# Patient Record
Sex: MALE | Race: WHITE | NOT HISPANIC OR LATINO | Employment: OTHER | URBAN - METROPOLITAN AREA
[De-identification: names, ages, dates, MRNs, and addresses within clinical notes are randomized per-mention and may not be internally consistent; named-entity substitution may affect disease eponyms.]

---

## 2022-09-04 ENCOUNTER — APPOINTMENT (OUTPATIENT)
Dept: CT IMAGING | Facility: CLINIC | Age: 72
DRG: 637 | End: 2022-09-04
Attending: EMERGENCY MEDICINE
Payer: MEDICARE

## 2022-09-04 ENCOUNTER — HOSPITAL ENCOUNTER (INPATIENT)
Facility: CLINIC | Age: 72
LOS: 12 days | Discharge: HOME | DRG: 637 | End: 2022-09-16
Attending: EMERGENCY MEDICINE | Admitting: INTERNAL MEDICINE
Payer: MEDICARE

## 2022-09-04 DIAGNOSIS — R41.0 CONFUSION: ICD-10-CM

## 2022-09-04 DIAGNOSIS — E10.65 TYPE 1 DIABETES MELLITUS WITH HYPERGLYCEMIA (H): ICD-10-CM

## 2022-09-04 DIAGNOSIS — F31.71 BIPOLAR DISORDER, IN PARTIAL REMISSION, MOST RECENT EPISODE HYPOMANIC (H): ICD-10-CM

## 2022-09-04 DIAGNOSIS — E11.00 HYPEROSMOLAR HYPERGLYCEMIC STATE (HHS) (H): Primary | ICD-10-CM

## 2022-09-04 LAB
ALBUMIN SERPL-MCNC: 4 G/DL (ref 3.4–5)
ALBUMIN UR-MCNC: NEGATIVE MG/DL
ALP SERPL-CCNC: 102 U/L (ref 40–150)
ALT SERPL W P-5'-P-CCNC: 16 U/L (ref 0–70)
ANION GAP SERPL CALCULATED.3IONS-SCNC: 11 MMOL/L (ref 3–14)
ANION GAP SERPL CALCULATED.3IONS-SCNC: 7 MMOL/L (ref 3–14)
APPEARANCE UR: CLEAR
AST SERPL W P-5'-P-CCNC: 15 U/L (ref 0–45)
ATRIAL RATE - MUSE: 96 BPM
BACTERIA #/AREA URNS HPF: ABNORMAL /HPF
BASOPHILS # BLD AUTO: 0 10E3/UL (ref 0–0.2)
BASOPHILS NFR BLD AUTO: 0 %
BILIRUB SERPL-MCNC: 0.7 MG/DL (ref 0.2–1.3)
BILIRUB UR QL STRIP: NEGATIVE
BUN SERPL-MCNC: 13 MG/DL (ref 7–30)
BUN SERPL-MCNC: 15 MG/DL (ref 7–30)
CALCIUM SERPL-MCNC: 8.6 MG/DL (ref 8.5–10.1)
CALCIUM SERPL-MCNC: 9.7 MG/DL (ref 8.5–10.1)
CHLORIDE BLD-SCNC: 100 MMOL/L (ref 94–109)
CHLORIDE BLD-SCNC: 109 MMOL/L (ref 94–109)
CO2 SERPL-SCNC: 22 MMOL/L (ref 20–32)
CO2 SERPL-SCNC: 23 MMOL/L (ref 20–32)
COLOR UR AUTO: ABNORMAL
CREAT SERPL-MCNC: 0.64 MG/DL (ref 0.66–1.25)
CREAT SERPL-MCNC: 0.7 MG/DL (ref 0.66–1.25)
DIASTOLIC BLOOD PRESSURE - MUSE: NORMAL MMHG
EOSINOPHIL # BLD AUTO: 0 10E3/UL (ref 0–0.7)
EOSINOPHIL NFR BLD AUTO: 1 %
ERYTHROCYTE [DISTWIDTH] IN BLOOD BY AUTOMATED COUNT: 12.5 % (ref 10–15)
GFR SERPL CREATININE-BSD FRML MDRD: >90 ML/MIN/1.73M2
GFR SERPL CREATININE-BSD FRML MDRD: >90 ML/MIN/1.73M2
GLUCOSE BLD-MCNC: 286 MG/DL (ref 70–99)
GLUCOSE BLD-MCNC: 606 MG/DL (ref 70–99)
GLUCOSE BLDC GLUCOMTR-MCNC: 248 MG/DL (ref 70–99)
GLUCOSE BLDC GLUCOMTR-MCNC: 322 MG/DL (ref 70–99)
GLUCOSE BLDC GLUCOMTR-MCNC: 367 MG/DL (ref 70–99)
GLUCOSE BLDC GLUCOMTR-MCNC: 550 MG/DL (ref 70–99)
GLUCOSE UR STRIP-MCNC: >=1000 MG/DL
HBA1C MFR BLD: 8.8 % (ref 0–5.6)
HCO3 BLDV-SCNC: 25 MMOL/L (ref 21–28)
HCT VFR BLD AUTO: 43.2 % (ref 40–53)
HGB BLD-MCNC: 14.6 G/DL (ref 13.3–17.7)
HGB UR QL STRIP: NEGATIVE
IMM GRANULOCYTES # BLD: 0 10E3/UL
IMM GRANULOCYTES NFR BLD: 0 %
INTERPRETATION ECG - MUSE: NORMAL
KETONES BLD-SCNC: 0.2 MMOL/L (ref 0–0.6)
KETONES BLD-SCNC: 1.9 MMOL/L (ref 0–0.6)
KETONES UR STRIP-MCNC: 40 MG/DL
LACTATE BLD-SCNC: 1.4 MMOL/L
LEUKOCYTE ESTERASE UR QL STRIP: NEGATIVE
LYMPHOCYTES # BLD AUTO: 1.4 10E3/UL (ref 0.8–5.3)
LYMPHOCYTES NFR BLD AUTO: 18 %
MAGNESIUM SERPL-MCNC: 1.8 MG/DL (ref 1.6–2.3)
MCH RBC QN AUTO: 30.7 PG (ref 26.5–33)
MCHC RBC AUTO-ENTMCNC: 33.8 G/DL (ref 31.5–36.5)
MCV RBC AUTO: 91 FL (ref 78–100)
MONOCYTES # BLD AUTO: 0.6 10E3/UL (ref 0–1.3)
MONOCYTES NFR BLD AUTO: 7 %
MUCOUS THREADS #/AREA URNS LPF: PRESENT /LPF
NEUTROPHILS # BLD AUTO: 5.6 10E3/UL (ref 1.6–8.3)
NEUTROPHILS NFR BLD AUTO: 74 %
NITRATE UR QL: NEGATIVE
NRBC # BLD AUTO: 0 10E3/UL
NRBC BLD AUTO-RTO: 0 /100
OSMOLALITY SERPL: 318 MMOL/KG (ref 280–301)
P AXIS - MUSE: 53 DEGREES
PCO2 BLDV: 40 MM HG (ref 40–50)
PH BLDV: 7.4 [PH] (ref 7.32–7.43)
PH UR STRIP: 5 [PH] (ref 5–7)
PHOSPHATE SERPL-MCNC: 4.2 MG/DL (ref 2.5–4.5)
PLATELET # BLD AUTO: 219 10E3/UL (ref 150–450)
PO2 BLDV: 55 MM HG (ref 25–47)
POTASSIUM BLD-SCNC: 3.7 MMOL/L (ref 3.4–5.3)
POTASSIUM BLD-SCNC: 4.4 MMOL/L (ref 3.4–5.3)
PR INTERVAL - MUSE: 206 MS
PROT SERPL-MCNC: 7.3 G/DL (ref 6.8–8.8)
QRS DURATION - MUSE: 126 MS
QT - MUSE: 392 MS
QTC - MUSE: 495 MS
R AXIS - MUSE: 0 DEGREES
RBC # BLD AUTO: 4.75 10E6/UL (ref 4.4–5.9)
RBC URINE: <1 /HPF
SAO2 % BLDV: 88 % (ref 94–100)
SODIUM SERPL-SCNC: 133 MMOL/L (ref 133–144)
SODIUM SERPL-SCNC: 139 MMOL/L (ref 133–144)
SP GR UR STRIP: 1.02 (ref 1–1.03)
SQUAMOUS EPITHELIAL: <1 /HPF
SYSTOLIC BLOOD PRESSURE - MUSE: NORMAL MMHG
T AXIS - MUSE: 43 DEGREES
TROPONIN I SERPL HS-MCNC: 4 NG/L
UROBILINOGEN UR STRIP-MCNC: NORMAL MG/DL
VENTRICULAR RATE- MUSE: 96 BPM
WBC # BLD AUTO: 7.6 10E3/UL (ref 4–11)
WBC URINE: 1 /HPF

## 2022-09-04 PROCEDURE — 83735 ASSAY OF MAGNESIUM: CPT | Performed by: PHYSICIAN ASSISTANT

## 2022-09-04 PROCEDURE — 81001 URINALYSIS AUTO W/SCOPE: CPT | Performed by: EMERGENCY MEDICINE

## 2022-09-04 PROCEDURE — 82803 BLOOD GASES ANY COMBINATION: CPT

## 2022-09-04 PROCEDURE — 70450 CT HEAD/BRAIN W/O DYE: CPT

## 2022-09-04 PROCEDURE — 96375 TX/PRO/DX INJ NEW DRUG ADDON: CPT

## 2022-09-04 PROCEDURE — 96365 THER/PROPH/DIAG IV INF INIT: CPT

## 2022-09-04 PROCEDURE — 250N000011 HC RX IP 250 OP 636: Performed by: EMERGENCY MEDICINE

## 2022-09-04 PROCEDURE — 83930 ASSAY OF BLOOD OSMOLALITY: CPT | Performed by: EMERGENCY MEDICINE

## 2022-09-04 PROCEDURE — 258N000003 HC RX IP 258 OP 636: Performed by: PHYSICIAN ASSISTANT

## 2022-09-04 PROCEDURE — 120N000013 HC R&B IMCU

## 2022-09-04 PROCEDURE — 84100 ASSAY OF PHOSPHORUS: CPT | Performed by: PHYSICIAN ASSISTANT

## 2022-09-04 PROCEDURE — 82010 KETONE BODYS QUAN: CPT | Performed by: PHYSICIAN ASSISTANT

## 2022-09-04 PROCEDURE — 83036 HEMOGLOBIN GLYCOSYLATED A1C: CPT | Performed by: EMERGENCY MEDICINE

## 2022-09-04 PROCEDURE — 96361 HYDRATE IV INFUSION ADD-ON: CPT

## 2022-09-04 PROCEDURE — 99291 CRITICAL CARE FIRST HOUR: CPT | Mod: 25

## 2022-09-04 PROCEDURE — 93005 ELECTROCARDIOGRAM TRACING: CPT

## 2022-09-04 PROCEDURE — 96376 TX/PRO/DX INJ SAME DRUG ADON: CPT

## 2022-09-04 PROCEDURE — 258N000003 HC RX IP 258 OP 636: Performed by: EMERGENCY MEDICINE

## 2022-09-04 PROCEDURE — 96366 THER/PROPH/DIAG IV INF ADDON: CPT

## 2022-09-04 PROCEDURE — 99223 1ST HOSP IP/OBS HIGH 75: CPT | Mod: AI | Performed by: PHYSICIAN ASSISTANT

## 2022-09-04 PROCEDURE — 250N000012 HC RX MED GY IP 250 OP 636 PS 637: Performed by: PHYSICIAN ASSISTANT

## 2022-09-04 PROCEDURE — 36415 COLL VENOUS BLD VENIPUNCTURE: CPT | Performed by: EMERGENCY MEDICINE

## 2022-09-04 PROCEDURE — 85025 COMPLETE CBC W/AUTO DIFF WBC: CPT | Performed by: EMERGENCY MEDICINE

## 2022-09-04 PROCEDURE — 82010 KETONE BODYS QUAN: CPT | Performed by: EMERGENCY MEDICINE

## 2022-09-04 PROCEDURE — 80053 COMPREHEN METABOLIC PANEL: CPT | Performed by: EMERGENCY MEDICINE

## 2022-09-04 PROCEDURE — 36415 COLL VENOUS BLD VENIPUNCTURE: CPT | Performed by: PHYSICIAN ASSISTANT

## 2022-09-04 PROCEDURE — 84484 ASSAY OF TROPONIN QUANT: CPT | Performed by: EMERGENCY MEDICINE

## 2022-09-04 RX ORDER — DEXTROSE MONOHYDRATE 25 G/50ML
25-50 INJECTION, SOLUTION INTRAVENOUS
Status: DISCONTINUED | OUTPATIENT
Start: 2022-09-04 | End: 2022-09-05

## 2022-09-04 RX ORDER — NICOTINE POLACRILEX 4 MG
15-30 LOZENGE BUCCAL
Status: DISCONTINUED | OUTPATIENT
Start: 2022-09-04 | End: 2022-09-05

## 2022-09-04 RX ORDER — INSULIN ASPART 100 [IU]/ML
14 INJECTION, SOLUTION INTRAVENOUS; SUBCUTANEOUS
Status: ON HOLD | COMMUNITY
End: 2022-09-16

## 2022-09-04 RX ORDER — ASPIRIN 81 MG/1
81 TABLET ORAL DAILY
Status: DISCONTINUED | OUTPATIENT
Start: 2022-09-05 | End: 2022-09-16 | Stop reason: HOSPADM

## 2022-09-04 RX ORDER — BENZONATATE 200 MG/1
200 CAPSULE ORAL 3 TIMES DAILY PRN
COMMUNITY

## 2022-09-04 RX ORDER — ACYCLOVIR 400 MG/1
400 TABLET ORAL 2 TIMES DAILY PRN
COMMUNITY

## 2022-09-04 RX ORDER — ACYCLOVIR 400 MG/1
400 TABLET ORAL 2 TIMES DAILY PRN
Status: DISCONTINUED | OUTPATIENT
Start: 2022-09-04 | End: 2022-09-16 | Stop reason: HOSPADM

## 2022-09-04 RX ORDER — INSULIN GLARGINE 100 [IU]/ML
22 INJECTION, SOLUTION SUBCUTANEOUS DAILY
Status: ON HOLD | COMMUNITY
End: 2022-09-16

## 2022-09-04 RX ORDER — ATORVASTATIN CALCIUM 40 MG/1
40 TABLET, FILM COATED ORAL EVERY EVENING
COMMUNITY

## 2022-09-04 RX ORDER — ASPIRIN 81 MG/1
81 TABLET ORAL DAILY
COMMUNITY

## 2022-09-04 RX ORDER — DROPERIDOL 2.5 MG/ML
2.5 INJECTION, SOLUTION INTRAMUSCULAR; INTRAVENOUS ONCE
Status: COMPLETED | OUTPATIENT
Start: 2022-09-04 | End: 2022-09-04

## 2022-09-04 RX ORDER — POTASSIUM CHLORIDE 7.45 MG/ML
10 INJECTION INTRAVENOUS ONCE
Status: DISCONTINUED | OUTPATIENT
Start: 2022-09-04 | End: 2022-09-05

## 2022-09-04 RX ORDER — LISINOPRIL 10 MG/1
10 TABLET ORAL DAILY
COMMUNITY
End: 2022-09-04

## 2022-09-04 RX ORDER — ATORVASTATIN CALCIUM 40 MG/1
40 TABLET, FILM COATED ORAL EVERY EVENING
Status: DISCONTINUED | OUTPATIENT
Start: 2022-09-04 | End: 2022-09-16 | Stop reason: HOSPADM

## 2022-09-04 RX ADMIN — DROPERIDOL 2.5 MG: 2.5 INJECTION, SOLUTION INTRAMUSCULAR; INTRAVENOUS at 18:45

## 2022-09-04 RX ADMIN — SODIUM CHLORIDE 1000 ML: 9 INJECTION, SOLUTION INTRAVENOUS at 17:00

## 2022-09-04 RX ADMIN — SODIUM CHLORIDE 10 UNITS: 9 INJECTION, SOLUTION INTRAVENOUS at 19:49

## 2022-09-04 RX ADMIN — SODIUM CHLORIDE 1000 ML: 9 INJECTION, SOLUTION INTRAVENOUS at 19:42

## 2022-09-04 ASSESSMENT — ACTIVITIES OF DAILY LIVING (ADL)
ADLS_ACUITY_SCORE: 35

## 2022-09-04 NOTE — ED NOTES
After conversation with family member on the phone pt was agreeable to finger stick, glucose read 550.
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
## 2022-09-04 NOTE — ED TRIAGE NOTES
Pt noted to have rambling speech pattern, answers orientation questions correctly, dexcom reads high but refusing glucometer check in triage. States he was seen in [place] and doesn't need more testing until he sees the MD. Wife reports symptoms x 3 days. Pt has history of prostate cancer.     Triage Assessment     Row Name 09/04/22 1515       Triage Assessment (Adult)    Airway WDL WDL       Respiratory WDL    Respiratory WDL WDL       Skin Circulation/Temperature WDL    Skin Circulation/Temperature WDL WDL       Cardiac WDL    Cardiac WDL WDL       Peripheral/Neurovascular WDL    Peripheral Neurovascular WDL WDL       Cognitive/Neuro/Behavioral WDL    Cognitive/Neuro/Behavioral WDL X  wife reports confusion, rambling speech, x 3 days    Level of Consciousness alert    Arousal Level opens eyes spontaneously    Orientation oriented x 4    Speech rambling    Mood/Behavior anxious  Refusing to have glucometer check, dexcom reads high       [place] Coma Scale    Best Eye Response 4-->(E4) spontaneous    Best Motor Response 6-->(M6) obeys commands    Best Verbal Response 5-->(V5) oriented    [place] Coma Scale Score 15               Breath sounds clear and equal bilaterally.
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
## 2022-09-05 ENCOUNTER — APPOINTMENT (OUTPATIENT)
Dept: MRI IMAGING | Facility: CLINIC | Age: 72
DRG: 637 | End: 2022-09-05
Attending: PHYSICIAN ASSISTANT
Payer: MEDICARE

## 2022-09-05 LAB
ALBUMIN SERPL-MCNC: 3.1 G/DL (ref 3.4–5)
ALP SERPL-CCNC: 73 U/L (ref 40–150)
ALT SERPL W P-5'-P-CCNC: 15 U/L (ref 0–70)
ANION GAP SERPL CALCULATED.3IONS-SCNC: 6 MMOL/L (ref 3–14)
AST SERPL W P-5'-P-CCNC: 10 U/L (ref 0–45)
BASOPHILS # BLD AUTO: 0 10E3/UL (ref 0–0.2)
BASOPHILS NFR BLD AUTO: 1 %
BILIRUB SERPL-MCNC: 0.6 MG/DL (ref 0.2–1.3)
BUN SERPL-MCNC: 9 MG/DL (ref 7–30)
CALCIUM SERPL-MCNC: 8.9 MG/DL (ref 8.5–10.1)
CHLORIDE BLD-SCNC: 110 MMOL/L (ref 94–109)
CO2 SERPL-SCNC: 25 MMOL/L (ref 20–32)
CREAT SERPL-MCNC: 0.59 MG/DL (ref 0.66–1.25)
EOSINOPHIL # BLD AUTO: 0.1 10E3/UL (ref 0–0.7)
EOSINOPHIL NFR BLD AUTO: 3 %
ERYTHROCYTE [DISTWIDTH] IN BLOOD BY AUTOMATED COUNT: 12.5 % (ref 10–15)
GFR SERPL CREATININE-BSD FRML MDRD: >90 ML/MIN/1.73M2
GLUCOSE BLD-MCNC: 150 MG/DL (ref 70–99)
GLUCOSE BLDC GLUCOMTR-MCNC: 119 MG/DL (ref 70–99)
GLUCOSE BLDC GLUCOMTR-MCNC: 125 MG/DL (ref 70–99)
GLUCOSE BLDC GLUCOMTR-MCNC: 126 MG/DL (ref 70–99)
GLUCOSE BLDC GLUCOMTR-MCNC: 132 MG/DL (ref 70–99)
GLUCOSE BLDC GLUCOMTR-MCNC: 135 MG/DL (ref 70–99)
GLUCOSE BLDC GLUCOMTR-MCNC: 138 MG/DL (ref 70–99)
GLUCOSE BLDC GLUCOMTR-MCNC: 140 MG/DL (ref 70–99)
GLUCOSE BLDC GLUCOMTR-MCNC: 146 MG/DL (ref 70–99)
GLUCOSE BLDC GLUCOMTR-MCNC: 153 MG/DL (ref 70–99)
GLUCOSE BLDC GLUCOMTR-MCNC: 160 MG/DL (ref 70–99)
GLUCOSE BLDC GLUCOMTR-MCNC: 167 MG/DL (ref 70–99)
GLUCOSE BLDC GLUCOMTR-MCNC: 169 MG/DL (ref 70–99)
GLUCOSE BLDC GLUCOMTR-MCNC: 174 MG/DL (ref 70–99)
GLUCOSE BLDC GLUCOMTR-MCNC: 174 MG/DL (ref 70–99)
GLUCOSE BLDC GLUCOMTR-MCNC: 181 MG/DL (ref 70–99)
GLUCOSE BLDC GLUCOMTR-MCNC: 181 MG/DL (ref 70–99)
GLUCOSE BLDC GLUCOMTR-MCNC: 189 MG/DL (ref 70–99)
GLUCOSE BLDC GLUCOMTR-MCNC: 200 MG/DL (ref 70–99)
GLUCOSE BLDC GLUCOMTR-MCNC: 218 MG/DL (ref 70–99)
GLUCOSE BLDC GLUCOMTR-MCNC: 236 MG/DL (ref 70–99)
GLUCOSE BLDC GLUCOMTR-MCNC: 275 MG/DL (ref 70–99)
GLUCOSE BLDC GLUCOMTR-MCNC: 291 MG/DL (ref 70–99)
HCT VFR BLD AUTO: 35.4 % (ref 40–53)
HGB BLD-MCNC: 12.2 G/DL (ref 13.3–17.7)
HOLD SPECIMEN: NORMAL
IMM GRANULOCYTES # BLD: 0 10E3/UL
IMM GRANULOCYTES NFR BLD: 0 %
KETONES BLD-SCNC: 0.5 MMOL/L (ref 0–0.6)
KETONES BLD-SCNC: 0.7 MMOL/L (ref 0–0.6)
LYMPHOCYTES # BLD AUTO: 1.2 10E3/UL (ref 0.8–5.3)
LYMPHOCYTES NFR BLD AUTO: 26 %
MCH RBC QN AUTO: 30.7 PG (ref 26.5–33)
MCHC RBC AUTO-ENTMCNC: 34.5 G/DL (ref 31.5–36.5)
MCV RBC AUTO: 89 FL (ref 78–100)
MONOCYTES # BLD AUTO: 0.5 10E3/UL (ref 0–1.3)
MONOCYTES NFR BLD AUTO: 11 %
NEUTROPHILS # BLD AUTO: 2.8 10E3/UL (ref 1.6–8.3)
NEUTROPHILS NFR BLD AUTO: 59 %
NRBC # BLD AUTO: 0 10E3/UL
NRBC BLD AUTO-RTO: 0 /100
PLATELET # BLD AUTO: 184 10E3/UL (ref 150–450)
POTASSIUM BLD-SCNC: 3.6 MMOL/L (ref 3.4–5.3)
PROT SERPL-MCNC: 5.7 G/DL (ref 6.8–8.8)
RBC # BLD AUTO: 3.97 10E6/UL (ref 4.4–5.9)
SODIUM SERPL-SCNC: 141 MMOL/L (ref 133–144)
WBC # BLD AUTO: 4.7 10E3/UL (ref 4–11)

## 2022-09-05 PROCEDURE — 250N000013 HC RX MED GY IP 250 OP 250 PS 637: Performed by: PHYSICIAN ASSISTANT

## 2022-09-05 PROCEDURE — 99233 SBSQ HOSP IP/OBS HIGH 50: CPT | Performed by: PHYSICIAN ASSISTANT

## 2022-09-05 PROCEDURE — 36415 COLL VENOUS BLD VENIPUNCTURE: CPT | Performed by: PHYSICIAN ASSISTANT

## 2022-09-05 PROCEDURE — 120N000013 HC R&B IMCU

## 2022-09-05 PROCEDURE — 250N000012 HC RX MED GY IP 250 OP 636 PS 637: Performed by: PHYSICIAN ASSISTANT

## 2022-09-05 PROCEDURE — 85025 COMPLETE CBC W/AUTO DIFF WBC: CPT | Performed by: PHYSICIAN ASSISTANT

## 2022-09-05 PROCEDURE — 250N000011 HC RX IP 250 OP 636: Performed by: PHYSICIAN ASSISTANT

## 2022-09-05 PROCEDURE — 82040 ASSAY OF SERUM ALBUMIN: CPT | Performed by: PHYSICIAN ASSISTANT

## 2022-09-05 PROCEDURE — 82010 KETONE BODYS QUAN: CPT | Performed by: PHYSICIAN ASSISTANT

## 2022-09-05 PROCEDURE — 258N000003 HC RX IP 258 OP 636: Performed by: PHYSICIAN ASSISTANT

## 2022-09-05 PROCEDURE — 70551 MRI BRAIN STEM W/O DYE: CPT

## 2022-09-05 PROCEDURE — 80053 COMPREHEN METABOLIC PANEL: CPT | Performed by: PHYSICIAN ASSISTANT

## 2022-09-05 RX ORDER — NALOXONE HYDROCHLORIDE 0.4 MG/ML
0.4 INJECTION, SOLUTION INTRAMUSCULAR; INTRAVENOUS; SUBCUTANEOUS
Status: DISCONTINUED | OUTPATIENT
Start: 2022-09-05 | End: 2022-09-16 | Stop reason: HOSPADM

## 2022-09-05 RX ORDER — DEXTROSE MONOHYDRATE 25 G/50ML
25-50 INJECTION, SOLUTION INTRAVENOUS
Status: DISCONTINUED | OUTPATIENT
Start: 2022-09-05 | End: 2022-09-16 | Stop reason: HOSPADM

## 2022-09-05 RX ORDER — ONDANSETRON 4 MG/1
4 TABLET, ORALLY DISINTEGRATING ORAL EVERY 6 HOURS PRN
Status: DISCONTINUED | OUTPATIENT
Start: 2022-09-05 | End: 2022-09-16 | Stop reason: HOSPADM

## 2022-09-05 RX ORDER — PROCHLORPERAZINE 25 MG
12.5 SUPPOSITORY, RECTAL RECTAL EVERY 12 HOURS PRN
Status: DISCONTINUED | OUTPATIENT
Start: 2022-09-05 | End: 2022-09-16 | Stop reason: HOSPADM

## 2022-09-05 RX ORDER — NALOXONE HYDROCHLORIDE 0.4 MG/ML
0.2 INJECTION, SOLUTION INTRAMUSCULAR; INTRAVENOUS; SUBCUTANEOUS
Status: DISCONTINUED | OUTPATIENT
Start: 2022-09-05 | End: 2022-09-16 | Stop reason: HOSPADM

## 2022-09-05 RX ORDER — ACETAMINOPHEN 325 MG/1
975 TABLET ORAL EVERY 8 HOURS
Status: DISCONTINUED | OUTPATIENT
Start: 2022-09-05 | End: 2022-09-16 | Stop reason: HOSPADM

## 2022-09-05 RX ORDER — ONDANSETRON 2 MG/ML
4 INJECTION INTRAMUSCULAR; INTRAVENOUS EVERY 6 HOURS PRN
Status: DISCONTINUED | OUTPATIENT
Start: 2022-09-05 | End: 2022-09-16 | Stop reason: HOSPADM

## 2022-09-05 RX ORDER — QUETIAPINE FUMARATE 25 MG/1
25 TABLET, FILM COATED ORAL 2 TIMES DAILY PRN
Status: DISCONTINUED | OUTPATIENT
Start: 2022-09-05 | End: 2022-09-16 | Stop reason: HOSPADM

## 2022-09-05 RX ORDER — HYDROMORPHONE HCL IN WATER/PF 6 MG/30 ML
0.2 PATIENT CONTROLLED ANALGESIA SYRINGE INTRAVENOUS
Status: DISCONTINUED | OUTPATIENT
Start: 2022-09-05 | End: 2022-09-16 | Stop reason: HOSPADM

## 2022-09-05 RX ORDER — PROCHLORPERAZINE MALEATE 5 MG
5 TABLET ORAL EVERY 6 HOURS PRN
Status: DISCONTINUED | OUTPATIENT
Start: 2022-09-05 | End: 2022-09-16 | Stop reason: HOSPADM

## 2022-09-05 RX ORDER — LIDOCAINE 40 MG/G
CREAM TOPICAL
Status: DISCONTINUED | OUTPATIENT
Start: 2022-09-05 | End: 2022-09-16 | Stop reason: HOSPADM

## 2022-09-05 RX ORDER — OLANZAPINE 5 MG/1
5 TABLET, ORALLY DISINTEGRATING ORAL DAILY PRN
Status: DISCONTINUED | OUTPATIENT
Start: 2022-09-05 | End: 2022-09-13

## 2022-09-05 RX ORDER — AMOXICILLIN 250 MG
2 CAPSULE ORAL 2 TIMES DAILY PRN
Status: DISCONTINUED | OUTPATIENT
Start: 2022-09-05 | End: 2022-09-16 | Stop reason: HOSPADM

## 2022-09-05 RX ORDER — NICOTINE POLACRILEX 4 MG
15-30 LOZENGE BUCCAL
Status: DISCONTINUED | OUTPATIENT
Start: 2022-09-05 | End: 2022-09-16 | Stop reason: HOSPADM

## 2022-09-05 RX ORDER — SODIUM CHLORIDE AND POTASSIUM CHLORIDE 150; 900 MG/100ML; MG/100ML
INJECTION, SOLUTION INTRAVENOUS CONTINUOUS
Status: DISCONTINUED | OUTPATIENT
Start: 2022-09-05 | End: 2022-09-06

## 2022-09-05 RX ORDER — DEXTROSE MONOHYDRATE, SODIUM CHLORIDE, AND POTASSIUM CHLORIDE 50; 1.49; 4.5 G/1000ML; G/1000ML; G/1000ML
INJECTION, SOLUTION INTRAVENOUS CONTINUOUS
Status: DISCONTINUED | OUTPATIENT
Start: 2022-09-05 | End: 2022-09-06

## 2022-09-05 RX ORDER — AMOXICILLIN 250 MG
1 CAPSULE ORAL 2 TIMES DAILY PRN
Status: DISCONTINUED | OUTPATIENT
Start: 2022-09-05 | End: 2022-09-16 | Stop reason: HOSPADM

## 2022-09-05 RX ADMIN — ACETAMINOPHEN 975 MG: 325 TABLET ORAL at 08:43

## 2022-09-05 RX ADMIN — SODIUM CHLORIDE: 9 INJECTION, SOLUTION INTRAVENOUS at 02:50

## 2022-09-05 RX ADMIN — ASPIRIN 81 MG: 81 TABLET, COATED ORAL at 08:43

## 2022-09-05 RX ADMIN — POTASSIUM CHLORIDE AND SODIUM CHLORIDE: 900; 150 INJECTION, SOLUTION INTRAVENOUS at 17:04

## 2022-09-05 RX ADMIN — SODIUM CHLORIDE: 9 INJECTION, SOLUTION INTRAVENOUS at 00:44

## 2022-09-05 RX ADMIN — ATORVASTATIN CALCIUM 40 MG: 40 TABLET, FILM COATED ORAL at 21:43

## 2022-09-05 RX ADMIN — ACETAMINOPHEN 975 MG: 325 TABLET ORAL at 16:06

## 2022-09-05 RX ADMIN — POTASSIUM CHLORIDE, DEXTROSE MONOHYDRATE AND SODIUM CHLORIDE: 150; 5; 450 INJECTION, SOLUTION INTRAVENOUS at 00:51

## 2022-09-05 RX ADMIN — POTASSIUM CHLORIDE AND SODIUM CHLORIDE: 900; 150 INJECTION, SOLUTION INTRAVENOUS at 08:52

## 2022-09-05 ASSESSMENT — ACTIVITIES OF DAILY LIVING (ADL)
ADLS_ACUITY_SCORE: 18
ADLS_ACUITY_SCORE: 18
WALKING_OR_CLIMBING_STAIRS_DIFFICULTY: NO
DOING_ERRANDS_INDEPENDENTLY_DIFFICULTY: NO
ADLS_ACUITY_SCORE: 18
TOILETING_ISSUES: NO
ADLS_ACUITY_SCORE: 18
DRESSING/BATHING_DIFFICULTY: NO
DIFFICULTY_EATING/SWALLOWING: NO
CHANGE_IN_FUNCTIONAL_STATUS_SINCE_ONSET_OF_CURRENT_ILLNESS/INJURY: NO
ADLS_ACUITY_SCORE: 18
ADLS_ACUITY_SCORE: 18
CONCENTRATING,_REMEMBERING_OR_MAKING_DECISIONS_DIFFICULTY: NO
ADLS_ACUITY_SCORE: 35
WEAR_GLASSES_OR_BLIND: NO
FALL_HISTORY_WITHIN_LAST_SIX_MONTHS: NO
ADLS_ACUITY_SCORE: 18

## 2022-09-05 NOTE — CONSULTS
"Triage and Transition - Consult and Liaison     Joshua Cannon  September 5, 2022    Session start: 10:00 am  Session end: 10:35 am  Session duration in minutes: 35  CPT utilized: 21825 - Brief diagnostic assessment (modifier 52)  Patient was seen virtually (Thrinacia cart or other teleconferencing device).    Diagnosis:   Other Unspecified and Specified Bipolar and Related Disorder 296.80 (F31.9) Unspecified Bipolar and Related Disorder, ,present;    Plan/Recommendations:     Continue care coordination.    Will ask provider to eval/comment on meds.    Gathered hx, sx only.     Maintain current transition plan per care team.    Please re-consult as needed.    Reason for consult: Psychiatry consult was requested due to \"manic behavior\". Patient was seen by Triage and Transition Consult & Liaison team.     Identifying information: Joshua is 72 year old White  male,   followed related to \"manic behavior\".  Pt reports he lives with his wife in [place].  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     Summary of Patient Situation  Pt agreeable to meet via  today.  Pt appears pleasantly manic. Pt states \"I am great, joyful, ecstatic, and happy\", smiling and laughing.  Per chart review pt with no hx of mental health.  Pt reports having diabetes.    Pt today presents with geoffrey sx including elevated, high energy, expansive, goal directed, decreased need for sleep, and talkativeness.  Pt very talkative throughout about a variety of topics.  Pt ruminating on \"[place] told me I have diabetes type II, now after 15 years of metformin\", pt talking about this a lot.  Pt states \"I have a dexcom, your hospital cannot read it, not high tech here\".  Pt reports he had a \"MRI yesterday and has a brain scan today\".  Pt denies any depression or anxiety sx.  Pt denies any SI, HI, AH/VH.  Per chart review, pt does not appear to endorse a psych hosp hx, with information available in epic ehr at the time of " "this assessment.  Pt states \"I have never had any mental health issues, I am happy, I made it through prostate cancer, I am a happy ecstatic marisela\".  Pt reports he is hopeful and future-oriented, looking forward to going to \"[employer] education class at the end of the week with my wife, and seeing my family\".    Pt rambling on may topics \"son an daughter water softner recently, covid, metformin, repeatedly mentions brain scan and MRI, novalog and the last 15 years of diabetes\".  Pt appears manic episode, however not meeting bipolar criteria, and no hx of bipolar.      Pt reports he has adequate supports including \"my wife, my 3 children, our grandchildren and our friends\".      Pt talks about historical coping skills including \"[employer] football games, traveling in my [employer] travel trailer, sailing on my sailboat that sits on [place], [employer] choir, and traveling\", pt reports \"we just got back to [place] from [facility] in May\".      Current Providers  Primary Care Provider: Yes \"Dr. Noguera\"     Collateral information:   Reviewed chart and coordinated with provider, left message.      Mental Status Exam   Affect: Other: expansive, elevated, goal directed, talkative  Appearance: Appropriate   Attention Span/Concentration: Attentive    Eye Contact: Engaged  Fund of Knowledge: Appropriate   Language /Speech Content: Fluent  Language /Speech Volume: Normal   Language /Speech Rate/Productions: Hyperverbal   Recent Memory: Intact  Remote Memory: Intact  Mood: Euphoric   Orientation:   Person: Yes   Place: Yes  Time of Day: Yes   Date: Yes   Situation (Do they understand why they are here?): Yes   Psychomotor Behavior: Hyperactive   Thought Content: Other: ruminating, euphoric, goal directed, talkative  Thought Form: Flight of Ideas, Goal Directed and Other: elevated, expansive,     Current medications: Per EHR  Current Facility-Administered Medications   Medication     0.9% sodium chloride + KCl 20 mEq/L infusion     " acetaminophen (TYLENOL) tablet 975 mg     acyclovir (ZOVIRAX) tablet 400 mg     aspirin EC tablet 81 mg     atorvastatin (LIPITOR) tablet 40 mg     dextrose 5% and 0.45% NaCl + KCl 20 mEq/L infusion     glucose gel 15-30 g    Or     dextrose 50 % injection 25-50 mL    Or     glucagon injection 1 mg     enzalutamide (XTANDI) capsule 160 mg     HYDROmorphone (DILAUDID) injection 0.2 mg     insulin 1 unit/1mL in saline (NovoLIN-Regular) infusion- HHS algorithm     lidocaine (LMX4) cream     lidocaine 1 % 0.1-1 mL     melatonin tablet 1 mg     naloxone (NARCAN) injection 0.2 mg    Or     naloxone (NARCAN) injection 0.4 mg    Or     naloxone (NARCAN) injection 0.2 mg    Or     naloxone (NARCAN) injection 0.4 mg     OLANZapine zydis (zyPREXA) ODT tab 5 mg     ondansetron (ZOFRAN ODT) ODT tab 4 mg    Or     ondansetron (ZOFRAN) injection 4 mg     oxyCODONE IR (ROXICODONE) half-tab 2.5 mg     Patient is already receiving mechanical prophylaxis     prochlorperazine (COMPAZINE) injection 5 mg    Or     prochlorperazine (COMPAZINE) tablet 5 mg    Or     prochlorperazine (COMPAZINE) suppository 12.5 mg     QUEtiapine (SEROquel) tablet 25 mg     senna-docusate (SENOKOT-S/PERICOLACE) 8.6-50 MG per tablet 1 tablet    Or     senna-docusate (SENOKOT-S/PERICOLACE) 8.6-50 MG per tablet 2 tablet     sodium chloride (PF) 0.9% PF flush 3 mL     sodium chloride (PF) 0.9% PF flush 3 mL     Therapeutic intervention and progress:  Therapeutic intervention consisted of building therapeutic rapport, active listening, validation, thought reframing and normalizing.     Elizabeth Parish MSW, LGSW  Triage and Transition - Consult and Liaison   [phone]

## 2022-09-05 NOTE — PLAN OF CARE
Pt A&Ox4, in manic state, VSS on 90 sating >RA%. Tele: NSR. LS clear. BS +, x1 BM this shift having adequate urine output. Pt report bilat hip and back pain, chronic pain, managing with scheduled Tylenol. Pt tolerating mod carb diet with carb coverage. Pt having adequate oral intake. SBA. PIV, insulin running in L hand and fluids running in R upper arm. Continue plan of care.     Goal Outcome Evaluation:  Plan of Care Reviewed With: patient, family   Overall Patient Progress: no change  Outcome Evaluation: Pt continues to be on insulin pump. Pt diet added. MRI of brain needs to be completed. Pt continues to be in manic state, hyper fixated on topics. Pt has pressured and anxious speech

## 2022-09-05 NOTE — ED NOTES
Pt becoming more agreeable now willing to let writer start IV fluids and one time push dose of novolog

## 2022-09-05 NOTE — PHARMACY-ADMISSION MEDICATION HISTORY
Pharmacy Medication History  Admission medication history interview status for the 9/4/2022  admission is complete. See EPIC admission navigator for prior to admission medications     Location of Interview: Patient room  Medication history sources: Patient and Care Everywhere    Significant changes made to the medication list:  1) Removed lisinopril. Pt states he is not taking and is not included in list from PCP from [facility] (Dr. Noguera)      Additional medication history information:   1) Roth and Dr. Noguera's note show that patient should be on Basaglar 40units at bedtime. Patent states he has been taking 22units in the [facility].     Medication reconciliation completed by provider prior to medication history? No    Time spent in this activity: 20 minute    Prior to Admission medications    Medication Sig Last Dose Taking? Auth Provider Long Term End Date   acyclovir (ZOVIRAX) 400 MG tablet Take 400 mg by mouth 2 times daily as needed (Cold sores) prn med Yes Unknown, Entered By History Yes    aspirin 81 MG EC tablet Take 81 mg by mouth daily 9/4/2022 at am Yes Unknown, Entered By History     atorvastatin (LIPITOR) 40 MG tablet Take 40 mg by mouth every evening 9/3/2022 at PM Yes Unknown, Entered By History Yes    benzonatate (TESSALON) 200 MG capsule Take 200 mg by mouth 3 times daily as needed for cough prn med Yes Unknown, Entered By History     enzalutamide (XTANDI) 40 MG capsule Take 160 mg by mouth daily 9/4/2022 at am Yes Unknown, Entered By History Yes    insulin aspart (NOVOLOG FLEXPEN) 100 UNIT/ML pen Inject 14 Units Subcutaneous 3 times daily (with meals) 9/4/2022 at Unknown time Yes Unknown, Entered By History     insulin glargine (BASAGLAR KWIKPEN) 100 UNIT/ML pen Inject 22 Units Subcutaneous daily 9/4/2022 at am Yes Unknown, Entered By History No        The information provided in this note is only as accurate as the sources available at the time of update(s)

## 2022-09-05 NOTE — PLAN OF CARE
Vital signs stable on room air. Alert and oriented but still in a slight manic state. Lung sounds diminished. Bowel sounds active, flatus present. Patient denies pain. Up stand by in room. Tolerating npo with ice chips. CMS/neuros intact. Tele sinus pranay. Insulin gtt continued with sugars in low to mid 100's algorithm 1.

## 2022-09-05 NOTE — ED NOTES
"[facility]  ED Nurse Handoff Report    ED Chief complaint: Altered Mental Status      ED Diagnosis:   Final diagnoses:   Confusion   Type 1 diabetes mellitus with hyperglycemia (H)       Code Status: Full Code    Allergies:   Allergies   Allergen Reactions     Niacin Itching       Patient Story: Pt noted to have rambling speech pattern, answers orientation questions correctly, dexcom reads high but refusing glucometer check in triage. States he was seen in [place] and doesn't need more testing until he sees the MD. Wife reports symptoms x 3 days. Pt has history of prostate cancer.    Focused Assessment:  Pt BG found to be 606 pt presents with pressured speech and flight of ideas. Continues to ramble about how he was miss diagnosed and [facility].  Pt given forced IV medication to help with agitation and placed on a hold (SHAWANDA). Pt now becoming more agreeable to interventions. Wont let writer start insulin drip but abl to start NS fluids and IV push insulin    Treatments and/or interventions provided: iv fluids insulin and blood work   Patient's response to treatments and/or interventions: pt dismissive with interventions but becoming more agreeable after droperidol medication    To be done/followed up on inpatient unit:  BG monitoring     Does this patient have any cognitive concerns?: Forgetful    Activity level - Baseline/Home:  Independent  Activity Level - Current:   Independent    Patient's Preferred language: English   Needed?: No    Isolation: None  Infection: Not Applicable  Patient tested for COVID 19 prior to admission: YES  Bariatric?: No    Vital Signs:   Vitals:    09/04/22 1511   BP: (!) 169/98   Pulse: 90   Resp: 16   Temp: 97  F (36.1  C)   TempSrc: Temporal   SpO2: 96%   Weight: 91.2 kg (201 lb)   Height: 1.854 m (6' 1\")       Cardiac Rhythm:     Was the PSS-3 completed:   Yes  What interventions are required if any?               Family Comments: concern for behavior and BG " being high   OBS brochure/video discussed/provided to patient/family: No              Name of person given brochure if not patient:               Relationship to patient: not present     For the majority of the shift this patient's behavior was yellow.   Behavioral interventions performed forced medication     ED NURSE PHONE NUMBER:  [id number]

## 2022-09-05 NOTE — ED NOTES
Pt given forced droperidol for agitation. Pt continues to argue about his A1C making no sense and contiuing to talk about his experience at [facility]. Pt refusing all cares and to start insulin for his BG. Pt escorted on bed with security to room 17 and placed on a hold
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
## 2022-09-06 LAB
AMPHETAMINES UR QL SCN: NORMAL
ANION GAP SERPL CALCULATED.3IONS-SCNC: 5 MMOL/L (ref 3–14)
BARBITURATES UR QL: NORMAL
BENZODIAZ UR QL: NORMAL
BUN SERPL-MCNC: 7 MG/DL (ref 7–30)
CALCIUM SERPL-MCNC: 8.9 MG/DL (ref 8.5–10.1)
CANNABINOIDS UR QL SCN: NORMAL
CHLORIDE BLD-SCNC: 108 MMOL/L (ref 94–109)
CO2 SERPL-SCNC: 25 MMOL/L (ref 20–32)
COCAINE UR QL: NORMAL
CREAT SERPL-MCNC: 0.57 MG/DL (ref 0.66–1.25)
ERYTHROCYTE [DISTWIDTH] IN BLOOD BY AUTOMATED COUNT: 12.8 % (ref 10–15)
GFR SERPL CREATININE-BSD FRML MDRD: >90 ML/MIN/1.73M2
GLUCOSE BLD-MCNC: 156 MG/DL (ref 70–99)
GLUCOSE BLDC GLUCOMTR-MCNC: 137 MG/DL (ref 70–99)
GLUCOSE BLDC GLUCOMTR-MCNC: 140 MG/DL (ref 70–99)
GLUCOSE BLDC GLUCOMTR-MCNC: 146 MG/DL (ref 70–99)
GLUCOSE BLDC GLUCOMTR-MCNC: 148 MG/DL (ref 70–99)
GLUCOSE BLDC GLUCOMTR-MCNC: 155 MG/DL (ref 70–99)
GLUCOSE BLDC GLUCOMTR-MCNC: 161 MG/DL (ref 70–99)
GLUCOSE BLDC GLUCOMTR-MCNC: 163 MG/DL (ref 70–99)
GLUCOSE BLDC GLUCOMTR-MCNC: 176 MG/DL (ref 70–99)
GLUCOSE BLDC GLUCOMTR-MCNC: 310 MG/DL (ref 70–99)
GLUCOSE BLDC GLUCOMTR-MCNC: 340 MG/DL (ref 70–99)
GLUCOSE BLDC GLUCOMTR-MCNC: 348 MG/DL (ref 70–99)
HCT VFR BLD AUTO: 41.6 % (ref 40–53)
HGB BLD-MCNC: 13.8 G/DL (ref 13.3–17.7)
MCH RBC QN AUTO: 30.6 PG (ref 26.5–33)
MCHC RBC AUTO-ENTMCNC: 33.2 G/DL (ref 31.5–36.5)
MCV RBC AUTO: 92 FL (ref 78–100)
OPIATES UR QL SCN: NORMAL
PCP UR QL SCN: NORMAL
PLATELET # BLD AUTO: 200 10E3/UL (ref 150–450)
POTASSIUM BLD-SCNC: 3.7 MMOL/L (ref 3.4–5.3)
RBC # BLD AUTO: 4.51 10E6/UL (ref 4.4–5.9)
SODIUM SERPL-SCNC: 138 MMOL/L (ref 133–144)
TSH SERPL DL<=0.005 MIU/L-ACNC: 1.33 MU/L (ref 0.4–4)
WBC # BLD AUTO: 6 10E3/UL (ref 4–11)

## 2022-09-06 PROCEDURE — 84443 ASSAY THYROID STIM HORMONE: CPT | Performed by: PHYSICIAN ASSISTANT

## 2022-09-06 PROCEDURE — 250N000013 HC RX MED GY IP 250 OP 250 PS 637: Performed by: PHYSICIAN ASSISTANT

## 2022-09-06 PROCEDURE — 250N000012 HC RX MED GY IP 250 OP 636 PS 637: Performed by: PHYSICIAN ASSISTANT

## 2022-09-06 PROCEDURE — 85027 COMPLETE CBC AUTOMATED: CPT | Performed by: PHYSICIAN ASSISTANT

## 2022-09-06 PROCEDURE — 93010 ELECTROCARDIOGRAM REPORT: CPT | Performed by: INTERNAL MEDICINE

## 2022-09-06 PROCEDURE — 36415 COLL VENOUS BLD VENIPUNCTURE: CPT | Performed by: PHYSICIAN ASSISTANT

## 2022-09-06 PROCEDURE — 93005 ELECTROCARDIOGRAM TRACING: CPT

## 2022-09-06 PROCEDURE — 80307 DRUG TEST PRSMV CHEM ANLYZR: CPT | Performed by: PHYSICIAN ASSISTANT

## 2022-09-06 PROCEDURE — 250N000013 HC RX MED GY IP 250 OP 250 PS 637: Performed by: PSYCHIATRY & NEUROLOGY

## 2022-09-06 PROCEDURE — 99233 SBSQ HOSP IP/OBS HIGH 50: CPT | Performed by: PHYSICIAN ASSISTANT

## 2022-09-06 PROCEDURE — 80048 BASIC METABOLIC PNL TOTAL CA: CPT | Performed by: PHYSICIAN ASSISTANT

## 2022-09-06 PROCEDURE — 99222 1ST HOSP IP/OBS MODERATE 55: CPT | Mod: AI | Performed by: PSYCHIATRY & NEUROLOGY

## 2022-09-06 PROCEDURE — 120N000013 HC R&B IMCU

## 2022-09-06 PROCEDURE — 258N000003 HC RX IP 258 OP 636: Performed by: PHYSICIAN ASSISTANT

## 2022-09-06 PROCEDURE — 250N000011 HC RX IP 250 OP 636: Performed by: PHYSICIAN ASSISTANT

## 2022-09-06 RX ORDER — CLONAZEPAM 0.5 MG/1
0.5 TABLET ORAL 2 TIMES DAILY PRN
Status: DISCONTINUED | OUTPATIENT
Start: 2022-09-06 | End: 2022-09-13

## 2022-09-06 RX ORDER — LIDOCAINE 4 G/G
1 PATCH TOPICAL
Status: DISCONTINUED | OUTPATIENT
Start: 2022-09-06 | End: 2022-09-16 | Stop reason: HOSPADM

## 2022-09-06 RX ORDER — HYDROXYZINE HYDROCHLORIDE 25 MG/1
50 TABLET, FILM COATED ORAL EVERY 6 HOURS PRN
Status: DISCONTINUED | OUTPATIENT
Start: 2022-09-06 | End: 2022-09-16 | Stop reason: HOSPADM

## 2022-09-06 RX ORDER — ARIPIPRAZOLE 5 MG/1
5 TABLET ORAL ONCE
Status: DISCONTINUED | OUTPATIENT
Start: 2022-09-06 | End: 2022-09-07

## 2022-09-06 RX ORDER — HYDROXYZINE HYDROCHLORIDE 25 MG/1
25 TABLET, FILM COATED ORAL EVERY 6 HOURS PRN
Status: DISCONTINUED | OUTPATIENT
Start: 2022-09-06 | End: 2022-09-16 | Stop reason: HOSPADM

## 2022-09-06 RX ADMIN — LIDOCAINE 1 PATCH: 560 PATCH PERCUTANEOUS; TOPICAL; TRANSDERMAL at 08:47

## 2022-09-06 RX ADMIN — ACETAMINOPHEN 975 MG: 325 TABLET ORAL at 04:34

## 2022-09-06 RX ADMIN — ASPIRIN 81 MG: 81 TABLET, COATED ORAL at 08:47

## 2022-09-06 RX ADMIN — POTASSIUM CHLORIDE, DEXTROSE MONOHYDRATE AND SODIUM CHLORIDE: 150; 5; 450 INJECTION, SOLUTION INTRAVENOUS at 09:33

## 2022-09-06 RX ADMIN — POTASSIUM CHLORIDE AND SODIUM CHLORIDE: 900; 150 INJECTION, SOLUTION INTRAVENOUS at 02:26

## 2022-09-06 RX ADMIN — ACETAMINOPHEN 975 MG: 325 TABLET ORAL at 20:55

## 2022-09-06 RX ADMIN — ACETAMINOPHEN 975 MG: 325 TABLET ORAL at 13:00

## 2022-09-06 RX ADMIN — SODIUM CHLORIDE: 9 INJECTION, SOLUTION INTRAVENOUS at 04:14

## 2022-09-06 RX ADMIN — ATORVASTATIN CALCIUM 40 MG: 40 TABLET, FILM COATED ORAL at 20:55

## 2022-09-06 RX ADMIN — INSULIN GLARGINE 18 UNITS: 100 INJECTION, SOLUTION SUBCUTANEOUS at 08:50

## 2022-09-06 ASSESSMENT — ACTIVITIES OF DAILY LIVING (ADL)
ADLS_ACUITY_SCORE: 18
ADLS_ACUITY_SCORE: 20
ADLS_ACUITY_SCORE: 18
ADLS_ACUITY_SCORE: 20
ADLS_ACUITY_SCORE: 18

## 2022-09-06 NOTE — PLAN OF CARE
Pt is A&Ox4, SBA to BR, mod carb diet. Tele is SR. Insulin gtts stopped and transitioned to SubQ this shift- BG's of 348 and 310, high insulin sliding scale. Hospitalist discussed plan of care extensively this shift w/ wife and patient who verbalized understanding. Lidocaine patch in place on L hip. Takes home chemo meds, in lock box. On O2 overnight. MRI done last night- see results. Pt rambles and is hyperactive. Psych consulted this AM- Ordered 5mg of Abilify and added some PRN's for overnight in case Pt becomes agitated/has trouble sleeping.

## 2022-09-06 NOTE — PLAN OF CARE
"Vital signs stable on room air. Alert and oriented but still in a slight manic state. Lung sounds diminished. Bowel sounds active, flatus present. Patient denies pain. Up stand by in room. Tolerating mod carb diet. CMS/neuros intact. Tele sinus pranay. Insulin gtt continued with sugars in low to mid 100's algorithm 1.Tele normal sinus. MRI done late last night. Wife is very concerned about hospital asking for advanced directives for patient. Wife is not understanding what the document is for and keeps stating the hospital is being \"discriminatory\" against them.   "
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
## 2022-09-07 ENCOUNTER — APPOINTMENT (OUTPATIENT)
Dept: OCCUPATIONAL THERAPY | Facility: CLINIC | Age: 72
DRG: 637 | End: 2022-09-07
Attending: PHYSICIAN ASSISTANT
Payer: MEDICARE

## 2022-09-07 LAB
ANION GAP SERPL CALCULATED.3IONS-SCNC: 7 MMOL/L (ref 3–14)
BUN SERPL-MCNC: 10 MG/DL (ref 7–30)
CALCIUM SERPL-MCNC: 9.2 MG/DL (ref 8.5–10.1)
CHLORIDE BLD-SCNC: 105 MMOL/L (ref 94–109)
CO2 SERPL-SCNC: 27 MMOL/L (ref 20–32)
CREAT SERPL-MCNC: 0.64 MG/DL (ref 0.66–1.25)
GFR SERPL CREATININE-BSD FRML MDRD: >90 ML/MIN/1.73M2
GLUCOSE BLD-MCNC: 235 MG/DL (ref 70–99)
GLUCOSE BLDC GLUCOMTR-MCNC: 166 MG/DL (ref 70–99)
GLUCOSE BLDC GLUCOMTR-MCNC: 175 MG/DL (ref 70–99)
GLUCOSE BLDC GLUCOMTR-MCNC: 256 MG/DL (ref 70–99)
GLUCOSE BLDC GLUCOMTR-MCNC: 342 MG/DL (ref 70–99)
GLUCOSE BLDC GLUCOMTR-MCNC: 356 MG/DL (ref 70–99)
GLUCOSE BLDC GLUCOMTR-MCNC: 58 MG/DL (ref 70–99)
GLUCOSE BLDC GLUCOMTR-MCNC: 75 MG/DL (ref 70–99)
POTASSIUM BLD-SCNC: 4.2 MMOL/L (ref 3.4–5.3)
SODIUM SERPL-SCNC: 139 MMOL/L (ref 133–144)

## 2022-09-07 PROCEDURE — 97535 SELF CARE MNGMENT TRAINING: CPT | Mod: GO | Performed by: OCCUPATIONAL THERAPIST

## 2022-09-07 PROCEDURE — 250N000013 HC RX MED GY IP 250 OP 250 PS 637: Performed by: PHYSICIAN ASSISTANT

## 2022-09-07 PROCEDURE — 120N000013 HC R&B IMCU

## 2022-09-07 PROCEDURE — 97165 OT EVAL LOW COMPLEX 30 MIN: CPT | Mod: GO | Performed by: OCCUPATIONAL THERAPIST

## 2022-09-07 PROCEDURE — 99233 SBSQ HOSP IP/OBS HIGH 50: CPT | Performed by: PHYSICIAN ASSISTANT

## 2022-09-07 PROCEDURE — 250N000013 HC RX MED GY IP 250 OP 250 PS 637: Performed by: PSYCHIATRY & NEUROLOGY

## 2022-09-07 PROCEDURE — 99232 SBSQ HOSP IP/OBS MODERATE 35: CPT | Performed by: PSYCHIATRY & NEUROLOGY

## 2022-09-07 PROCEDURE — 36415 COLL VENOUS BLD VENIPUNCTURE: CPT | Performed by: PHYSICIAN ASSISTANT

## 2022-09-07 PROCEDURE — 80048 BASIC METABOLIC PNL TOTAL CA: CPT | Performed by: PHYSICIAN ASSISTANT

## 2022-09-07 RX ORDER — ARIPIPRAZOLE 5 MG/1
5 TABLET ORAL DAILY
Status: DISCONTINUED | OUTPATIENT
Start: 2022-09-07 | End: 2022-09-08

## 2022-09-07 RX ORDER — HYDRALAZINE HYDROCHLORIDE 20 MG/ML
10 INJECTION INTRAMUSCULAR; INTRAVENOUS EVERY 4 HOURS PRN
Status: DISCONTINUED | OUTPATIENT
Start: 2022-09-07 | End: 2022-09-16 | Stop reason: HOSPADM

## 2022-09-07 RX ORDER — ARIPIPRAZOLE 2 MG/1
2 TABLET ORAL DAILY
Status: DISCONTINUED | OUTPATIENT
Start: 2022-09-07 | End: 2022-09-07

## 2022-09-07 RX ADMIN — ACETAMINOPHEN 975 MG: 325 TABLET ORAL at 20:17

## 2022-09-07 RX ADMIN — ARIPIPRAZOLE 5 MG: 5 TABLET ORAL at 19:01

## 2022-09-07 RX ADMIN — ACETAMINOPHEN 975 MG: 325 TABLET ORAL at 05:49

## 2022-09-07 RX ADMIN — ACETAMINOPHEN 975 MG: 325 TABLET ORAL at 13:57

## 2022-09-07 RX ADMIN — LIDOCAINE 1 PATCH: 560 PATCH PERCUTANEOUS; TOPICAL; TRANSDERMAL at 09:21

## 2022-09-07 RX ADMIN — ATORVASTATIN CALCIUM 40 MG: 40 TABLET, FILM COATED ORAL at 19:01

## 2022-09-07 RX ADMIN — ASPIRIN 81 MG: 81 TABLET, COATED ORAL at 09:23

## 2022-09-07 ASSESSMENT — ACTIVITIES OF DAILY LIVING (ADL)
ADLS_ACUITY_SCORE: 20
PREVIOUS_RESPONSIBILITIES: MEAL PREP;HOUSEKEEPING;SHOPPING;LAUNDRY;MEDICATION MANAGEMENT;DRIVING
ADLS_ACUITY_SCORE: 20
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
## 2022-09-07 NOTE — CONSULTS
[facility] Psychiatric Consult Progress Note    Interval History:   Pt seen, chart reviewed, case discussed with nursing staff and treating clinicians.     Nursing reports patient continues to present as manic, hyper verbal, etc today. He has been refusing Abilify because he reported he was an alcoholic and thus concerned about taking mental health related medications    On my interview patient had just completed time with OT and scored 22/30 on SLUMs. He was again a bit elevated, talking excessively, circumstantial and tangential. He denies SI/HI, psychosis or racing thoughts. He says he slept pretty well. He says he does not mind being in the hospital. Joked about how much he liked it when writer wanted to reassure him we do not want to keep him longer than he has to be here. He wanted to ask questions about Abilify and with answering these concerns he stated he would be willing to go ahead and try it. We did review criteria for bipolar disorder, his family hist       Review of systems:   10 point Review of Systems completed by Dr. Hutchison, and is  is negative other than noted in the HPI     Medications:       acetaminophen  975 mg Oral Q8H     ARIPiprazole  5 mg Oral Once     aspirin  81 mg Oral Daily     atorvastatin  40 mg Oral QPM     enzalutamide  160 mg Oral Daily     insulin aspart  12 Units Subcutaneous BID w/meals     insulin aspart  8 Units Subcutaneous Daily with supper     insulin aspart  1-7 Units Subcutaneous TID AC     insulin aspart  1-5 Units Subcutaneous At Bedtime     insulin glargine  21 Units Subcutaneous QAM AC     lidocaine  1 patch Transdermal Q24H     lidocaine   Transdermal Q8H LISSY     sodium chloride (PF)  3 mL Intracatheter Q8H     acyclovir, clonazePAM, glucose **OR** dextrose **OR** glucagon, hydrALAZINE, HYDROmorphone, hydrOXYzine **OR** hydrOXYzine, lidocaine 4%, lidocaine (buffered or not buffered), - MEDICATION INSTRUCTIONS -, melatonin, naloxone **OR** naloxone  **OR** naloxone **OR** naloxone, OLANZapine zydis, ondansetron **OR** ondansetron, oxyCODONE, - MEDICATION INSTRUCTIONS -, prochlorperazine **OR** prochlorperazine **OR** prochlorperazine, QUEtiapine, senna-docusate **OR** senna-docusate, sodium chloride (PF)    Mental Status Examination:     Appearance:  awake, alert  Eye Contact:  good  Speech:  quick, volumnous at times, slighlty pressured  Language:Normal  Psychomotor Behavior:  no evidence of tardive dyskinesia, dystonia, or tics  Mood:  better  Affect:  mood congruent and elevated, gregarious at times  Thought Process:  goal oriented, tangental and circumstantial no loose associations  Thought Content:  no evidence of suicidal ideation or homicidal ideation and no auditory hallucinations present  Oriented to:  time, person, and place  Attention Span and Concentration:  fair  Recent and Remote Memory:  fair  Fund of Knowledge: appropriate  Muscle Strength and Tone: normal  Gait and Station: Normal  Insight:  limited  Judgment:  limited        Labs/Vitals:     Recent Results (from the past 24 hour(s))   Glucose by meter    Collection Time: 09/06/22  6:07 PM   Result Value Ref Range    GLUCOSE BY METER POCT 310 (H) 70 - 99 mg/dL   EKG 12-lead, tracing only    Collection Time: 09/06/22  7:07 PM   Result Value Ref Range    Systolic Blood Pressure  mmHg    Diastolic Blood Pressure  mmHg    Ventricular Rate 71 BPM    Atrial Rate 71 BPM    OH Interval 164 ms    QRS Duration 116 ms     ms    QTc 465 ms    P Axis 71 degrees    R AXIS 79 degrees    T Axis 64 degrees    Interpretation ECG       Sinus rhythm with occasional Premature ventricular complexes  Otherwise normal ECG  When compared with ECG of 04-SEP-2022 16:26,  Premature ventricular complexes are now Present  Questionable change in QRS axis     Glucose by meter    Collection Time: 09/06/22  9:56 PM   Result Value Ref Range    GLUCOSE BY METER POCT 137 (H) 70 - 99 mg/dL   Glucose by meter    Collection  Time: 09/07/22  2:16 AM   Result Value Ref Range    GLUCOSE BY METER POCT 58 (L) 70 - 99 mg/dL   Glucose by meter    Collection Time: 09/07/22  2:33 AM   Result Value Ref Range    GLUCOSE BY METER POCT 75 70 - 99 mg/dL   Glucose by meter    Collection Time: 09/07/22  2:49 AM   Result Value Ref Range    GLUCOSE BY METER POCT 166 (H) 70 - 99 mg/dL   Basic metabolic panel    Collection Time: 09/07/22  5:46 AM   Result Value Ref Range    Sodium 139 133 - 144 mmol/L    Potassium 4.2 3.4 - 5.3 mmol/L    Chloride 105 94 - 109 mmol/L    Carbon Dioxide (CO2) 27 20 - 32 mmol/L    Anion Gap 7 3 - 14 mmol/L    Urea Nitrogen 10 7 - 30 mg/dL    Creatinine 0.64 (L) 0.66 - 1.25 mg/dL    Calcium 9.2 8.5 - 10.1 mg/dL    Glucose 235 (H) 70 - 99 mg/dL    GFR Estimate >90 >60 mL/min/1.73m2   Glucose by meter    Collection Time: 09/07/22  8:32 AM   Result Value Ref Range    GLUCOSE BY METER POCT 342 (H) 70 - 99 mg/dL   Glucose by meter    Collection Time: 09/07/22 11:34 AM   Result Value Ref Range    GLUCOSE BY METER POCT 356 (H) 70 - 99 mg/dL     B/P: 136/108, T: 98, P: 84, R: 16    Impression:   1.  Unspecified bipolar spectrum and related disorder.  2.  Possible depression episodes by history.  3.  Prostate cancer.  4.  Diabetes mellitus, type 1.  5.  Recent COVID infection.     FORMULATION:  This is a 72-year-old gentleman with a possible history of prior depression episodes, although nothing formally diagnosed in terms of psychiatric history as well as medical history of diabetes mellitus type 1, recent COVID infection, prostate cancer under treatment with chemotherapy.  He presented with acute manic symptoms ongoing for the past week and a half in the setting of recent COVID infection as well as recent change in diabetes regimen, including a transition from metformin to insulin.  He has had blood sugars quite elevated at times above 600.  His wife also is concerned that the patient's mental status seemed to decline after he got  COVID and recovered, and she believes that there are some case reports of this occurring after COVID.  There are possible side effects from medications like his enzalutamide that does have a 1%-10% risk of psychiatric adverse effects, although my suspicion of that being a causative directly in this case is low.  Nonetheless, with his family history of bipolar disorder in his sister and prior depression episodes, it is very possible the patient eventually has met the minimum threshold in terms of stressors physically and mentally in the setting of the above that eventually caused him to declare his first manic phase.  It is unusual for this to occur in your 70s of course, but not unheard of.  It is also sometimes hard to determine if the patient has had more mild subclinical hypomania phases in the past that never fully declared.    Update 9/7/22  Still presenting as manic. He is has had some labile hypertension as well today. Has refused Abilyf up to this point but with conversation of risks/benefits today he is now willing .         Plan:   1.  Continue medical stabilization.  2.  Reconsult psychiatry as needed.  3.  Recommend starting Abilify 5 mg today and then 10 mg tomorrow. Risks including worsening diabetes, cardiac arrhythmias, weight gain, movement disorder reviewed.  His enzalutamide is an inducer of metabolism for Abilify and so, he might ultimately require higher dosing of Abilify than otherwise.  For now, I am choosing Abilify to start given his brittle diabetes that has been apparently very difficult to manage with insulin.  Consider Depakote and lithium, but with his medical complexity, for now, I would still suggest trying a second generation antipsychotic with lower risk for diabetes worsening, such as Abilify.  QTc recheck was 460 after being slightly long at 495 earlier in the admission.   4.   Klonopin 0.5 mg twice a day as needed for agitation or anxiety.  Did discuss with nursing staff there  would be a fall risk with this if given that they will have to monitor closely for.  Also, I added hydroxyzine p.r.n. for anxiety and agitation additionally.  Further, will add olanzapine for more severe agitation or manic symptoms occurring overnight or in the evening.  5. Once medically cleared if still demonstrating geoffrey may need to consider transfer to [facility]  Attestation:  Patient has been seen and evaluated by me,  Ramirez Hutchison MD

## 2022-09-07 NOTE — PROGRESS NOTES
09/07/22 1350   Quick Adds   Type of Visit Initial Occupational Therapy Evaluation   Living Environment   People in Home spouse   Current Living Arrangements house   Home Accessibility stairs to enter home;stairs within home   Number of Stairs, Main Entrance 4   Number of Stairs, Within Home, Primary greater than 10 stairs  (flight of stairs)   Living Environment Comments Pt lives in rambler style home w/ spouse. 4 XAVIER. 1 flight of stairs to basement.   Self-Care   Usual Activity Tolerance good   Current Activity Tolerance good   Regular Exercise No   Activity/Exercise Type biking   Equipment Currently Used at Home none   Fall history within last six months no   Activity/Exercise/Self-Care Comment Independent w/ all ADLs/IADLs w/o device at baseline.   Instrumental Activities of Daily Living (IADL)   Previous Responsibilities meal prep;housekeeping;shopping;laundry;medication management;driving   IADL Comments Pt reports independence w/ most IADLs. Pt states spouse states he can't mow lawn anymore d/t hip issues. Pt also reports caretaker for spouse, has sole responsibility for IADLs at home.   General Information   Onset of Illness/Injury or Date of Surgery 09/04/22   Referring Physician Justin Gallo PA-C   Patient/Family Therapy Goal Statement (OT) Return home   Additional Occupational Profile Info/Pertinent History of Current Problem Past medical history significant for DM1 (previously thought to be DM2 until 2 wks PTA), hip pain and osteoarthritis, crohns disease with colectomy, prostate cancer, and HLD who was admitted to [facility] on 9/4/2022 with severe hyperglycemia c/w HHS, AMS, and manic like behavior.   Performance Patterns (Routines, Roles, Habits) Retired   Cognitive Status Examination   Orientation Status orientation to person, place and time   Affect/Mental Status (Cognitive) confabulatory;confused   Follows Commands follows one-step commands   Safety Deficit  impulsivity;insight into deficits/self-awareness;judgment;at risk behavior observed   Attention Deficit focused/sustained attention;alternating attention;perseverates on recent conversation   Cognitive Screens/Assessments   Cognitive Assessments Completed [facility] Mental Status Exam (UMS):  Total Score out of /30 22   [facility] Norms 21-26 equals mild neurocognitive disorder   [facility] Domains assessed: orientation, memory, attention, executive functions   SLUMS Interpretation Pt demo'ing deficits in attention, executive function. Multiple cues to attend to task. Further cognitive assessement warranted.   Pain Assessment   Patient Currently in Pain No   Range of Motion Comprehensive   General Range of Motion no range of motion deficits identified   Strength Comprehensive (MMT)   General Manual Muscle Testing (MMT) Assessment no strength deficits identified   Coordination   Upper Extremity Coordination No deficits were identified   Transfers   Transfers sit-stand transfer   Sit-Stand Transfer   Sit-Stand [place] (Transfers) supervision   Activities of Daily Living   BADL Assessment/Intervention no deficits identified   Clinical Impression   Criteria for Skilled Therapeutic Interventions Met (OT) Yes, treatment indicated   OT Diagnosis Decreased cognition for ADLs/IADLs   OT Problem List-Impairments impacting ADL problems related to;cognition   Assessment of Occupational Performance 1-3 Performance Deficits   Identified Performance Deficits cognition for ADLs/IADLs   Planned Therapy Interventions (OT) IADL retraining;cognition   Clinical Decision Making Complexity (OT) low complexity   Risk & Benefits of therapy have been explained patient   OT Discharge Planning   OT Discharge Recommendation (DC Rec) home with assist;home with outpatient occupational therapy   OT Rationale for DC Rec Pt presents below baseline for ADL/IADL performance after admission w/ AMS, manic episode. Pt states he is primary  caretaker of household chores/tasks. Spouse is independent, however plans to have hip surgery. SLUMs 22/30. Limited by impusliveness, decreased attention/concentration. Pt will need assist w/ IADLs (meal prep, med set up, finances, driving) upon discharge for safety and reliable daily checks for safety.   Total Evaluation Time (Minutes)   Total Evaluation Time (Minutes) 15   OT Goals   Therapy Frequency (OT) 3 times/wk   OT Predicted Duration/Target Date for Goal Attainment 09/14/22   OT Goals Cognition;OT Goal 1;OT Goal 2   OT: Cognitive Patient/caregiver will verbalize understanding of cognitive assessment results/recommendations as needed for safe discharge planning   OT: Goal 1 Pt will complete med set up activity w/ 75% accuarcy to faciliate safe discharge home.   OT: Goal 2 Pt will complete home safety questions w/ >75% accuracy to faciliate safe discharge home.

## 2022-09-07 NOTE — PLAN OF CARE
Pt is A&Ox4, independent in the room, mod carb diet. Neuros intact. Tele SB. Hospitalist spent plenty of time talking w/ Pt and Psych spent extensive time explaining meds and manic state to Pt who seemed somewhat receptive- Agreed to start Abilify. Diabetes education class scheduled for Friday, Pt and wife aware. Release of Information filled out by Pt and wife for multiple doctors/facilities- given to [facility]. Lidocaine patch in place on R hip/groin. Very pleasant throughout shift but can be agitated/annoyed when interrupted. Rambling/hyperactive speech. Hyperfixation and slight paranoia present. Switched to tele box d/t Pt constantly wanting to get up and walk around. PIV, SL.

## 2022-09-07 NOTE — CONSULTS
Consult Date: 09/06/2022    PSYCHIATRY CONSULTATION    REASON FOR CONSULTATION:  Manic behavior.    REFERRING PRACTITIONER:  Justin Gallo PA-C.    HISTORY OF PRESENT ILLNESS:  Mr. Joshua Cannon is a 72-year-old gentleman with history of diabetes mellitus type 1, hip pain, osteoarthritis, Crohn's disease, colectomy, prostate cancer, hyperlipidemia, who was admitted to [facility] on 09/04/2022 with severe hyperglycemia as well as manic-like behavior.    The patient is currently being stabilized on station 66.  The patient initially presented with his wife with concerns that he has been acting manic recently.  He was apparently driving on the shoulder on the highway, when traffic was otherwise at a standstill, at a concerning rate of speed.  The patient was demonstrating manic behavior in the Emergency Department.  Did require p.r.n. medication to help calm him down.  He has been hyperverbal and irritable at times.  The patient demonstrated grandiosity as well.      The patient reports he does acknowledge he has had some changes where others have been concerned about how he is doing.  He speaks at great length and quite circumstantially about what has been going on.  He brings up his [place] doctors as well as his primary care doctor as well as concerns about prior medical care he has had in the past.  He is a bit antagonistic about medical care at this time.  He got upset with me at one point, somewhat agitated and paranoid, and raised his voice and asked me to sit back down.  He reports that he is sleeping fine.  He has good appetite.  Reports his energy is very good.  His mood has been euphoric at times as well as irritable.  He denies any psychosis symptoms overtly.  There is no known prior OCD, eating disorder or prior ADHD or prior PTSD.  The patient denies prior trauma or abuse.  I did speak to the patient's significant other as well.  He reports back to get her perspective.  She reports serious  "concern about the patient demonstrating \"bipolar manic behaviors\" since about a week and a half ago.  She is concerned that he had a COVID infection from which he recovered about 3 weeks ago, but he did have loss of taste and smell that has been gradually improving.  The patient does have a history of a sister with bipolar disorder that was apparently quite serious.  The patient, according to his significant other, has had prior depression episodes, but never has had a declared manic episode as far she knows (patient also denied prior manic episodes).  Other possible contributing factors are he does have recently diagnosed diabetes mellitus type 1 and had metformin stopped in recent weeks and was transitioned to insulin regimen.  In discussing with the hospitalist, it sounds like it is unclear how reliable the patient has been with taking his insulin correctly.  The patient also is having some paranoia about his significant other, in discussing with the hospitalist, about managing his insulin and so forth.    PAST PSYCHIATRIC HISTORY:  No prior psychiatric admissions or prior psychiatric diagnoses or medication trials.  No prior suicidal behavior.    PAST MEDICAL HISTORY:    1.  High cholesterol.  2.  Hypertension.  3.  Shortness of breath on exertion.  4.  Type 2 diabetes mellitus.  5.  Ulcerative colitis.  6.  Prostate cancer.    PAST SURGICAL HISTORY:  Reviewed from the EMR.    SOCIAL HISTORY:  The patient is a retired contractor.  He has prior college, I believe, graduate education as well.  Former smoker.  No alcohol for many years.  No illicit drug use or marijuana.  The patient resides with his significant other.  I believe he has 3 children and some grandchildren.    FAMILY HISTORY:  Father with coronary artery disease.  Mother is reported with mental illness, diagnosis is not reported.  Sister with bipolar disorder, apparently quite serious.    PRIOR TO ADMISSION MEDICATIONS:    1.  Acyclovir.  2.  " Aspirin.  3.  Atorvastatin.  4.  Benzonatate.  5.  Enzalutamide.  6.  Insulin aspart.  7.  Insulin glargine.    ALLERGIES:  NIACIN.    PHYSICAL EXAMINATION:    VITAL SIGNS:  Blood pressure 155/92.  Temperature 97.6.  Pulse 66.  Respiratory rate 19.  SpO2 of 98% on room air.  MENTAL STATUS EXAMINATION:  He is dressed in a hospital gown, seated upright.  He is somewhat large appearing, slightly intimidating in terms of his vocal presence, and at times, irritability, directing people about the room and so forth.  He speaks at substantial length.  He is circumstantial; at times, tangential without getting back to the point.  He is not overtly psychotic.  No hallucinations.  Use of language without aphasia.  Speech is otherwise fluent, but again quite voluminous and slightly pressured at times.  Denies suicidal or homicidal ideation.  No fluctuation in cognition overtly; one does wonder about some possible underlying cognitive sequelae.  It is unclear how short- and long-term memory are fully doing, but he does go on at length about prior history and might be accurate about this to some extent.  Insight and judgment are impaired based on conversation and history.  Attendance and concentration impaired based on conversation.  No involuntary movements.  No agitation or aggression.    IMPRESSION AND PLAN:    1.  Unspecified bipolar spectrum and related disorder.  2.  Possible depression episodes by history.  3.  Prostate cancer.  4.  Diabetes mellitus, type 1.  5.  Recent COVID infection.    FORMULATION:  This is a 72-year-old gentleman with a possible history of prior depression episodes, although nothing formally diagnosed in terms of psychiatric history as well as medical history of diabetes mellitus type 1, recent COVID infection, prostate cancer under treatment with chemotherapy.  He presented with acute manic symptoms ongoing for the past week and a half in the setting of recent COVID infection as well as recent  change in diabetes regimen, including a transition from metformin to insulin.  He has had blood sugars quite elevated at times above 600.  His wife also is concerned that the patient's mental status seemed to decline after he got COVID and recovered, and she believes that there are some case reports of this occurring after COVID.  There are possible side effects from medications like his enzalutamide that does have a 1%-10% risk of psychiatric adverse effects, although my suspicion of that being a causative directly in this case is low.  Nonetheless, with his family history of bipolar disorder in his sister and prior depression episodes, it is very possible the patient eventually has met the minimum threshold in terms of stressors physically and mentally in the setting of the above that eventually caused him to declare his first manic phase.  It is unusual for this to occur in your 70s of course, but not unheard of.  It is also sometimes hard to determine if the patient has had more mild subclinical hypomania phases in the past that never fully declared.    RISK LEVEL:  Risk of harm to self or others is elevated slightly above the general population in the setting of ongoing manic symptoms.  He is not with any suicidal or homicidal ideation, not demonstrating any overt aggression to self or others, but still, his manic symptoms have been very alarming to his family and his wife does believes that the patient is at risk of harming himself with his manic symptoms, such as driving erratically and so forth.  She is not comfortable with the patient going home at this time and was requesting interventions to continue to monitor him psychiatrically.    RESULTS REVIEW:  Creatinine was 0.57.  Sodium 138.  Potassium 3.7.  TSH 1.33.  LFTs are unremarkable.  Albumin is just slightly low at 3.1.  CBC was unremarkable.  Imaging includes CT of the brain as well as an MRI of the brain with no acute intracranial findings.   Urinalysis was unremarkable.  Urine drug screen was negative.  EKG showed a QTc 495.    RECOMMENDATIONS:    1.  Continue medical stabilization.  2.  Reconsult psychiatry as needed.  3.  Recommend starting Abilify 5 mg today and then 10 mg tomorrow.  His enzalutamide is an inducer of metabolism for Abilify and so, he might ultimately require higher dosing of Abilify than otherwise.  For now, I am choosing Abilify to start given his brittle diabetes that has been apparently very difficult to manage with insulin.  Consider Depakote and lithium, but with his medical complexity, for now, I would still suggest trying a second generation antipsychotic to start that potentially has a bit more   and reduce risk for diabetes, such as Abilify.  I do note the patient's recent EKG from 2022 did show a slightly elevated QTc at 495.  Will recheck this with another EKG to see if this has improved at all.  Abilify is a good choice, as it does have the lowest risk for QTc prolongation or cardiac arrhythmias among antipsychotics.   4.  Will also Klonopin 0.5 mg twice a day as needed for agitation or anxiety.  Did discuss with nursing staff there would be a fall risk with this if given that they will have to monitor closely for.  Also, I added hydroxyzine p.r.n. for anxiety and agitation additionally.  Further, will add olanzapine for more severe agitation or manic symptoms occurring overnight or in the evening.      Ramirez Hutchison MD        D: 2022   T: 2022   MT: ARMINDA    Name:     JON HEMPHILL  MRN:      [id number]        Account:      [id number]   :      1950           Consult Date: 2022     Document: [id number]

## 2022-09-07 NOTE — PLAN OF CARE
Goal Outcome Evaluation:    Episode of hypoglycemia overnight, BG 58.  A/O x 4, anxious, frequently rambling. Tele: Sinus [place]. Denies pain. On RA, LS clear. Neuros intact. BS+. Refused Abilify. PIV SL. Up SBA. Incontinent of B&B at times.  Mod carb diet. B, 58, 75, 166.

## 2022-09-08 LAB
ATRIAL RATE - MUSE: 71 BPM
DIASTOLIC BLOOD PRESSURE - MUSE: NORMAL MMHG
GLUCOSE BLDC GLUCOMTR-MCNC: 170 MG/DL (ref 70–99)
GLUCOSE BLDC GLUCOMTR-MCNC: 203 MG/DL (ref 70–99)
GLUCOSE BLDC GLUCOMTR-MCNC: 219 MG/DL (ref 70–99)
GLUCOSE BLDC GLUCOMTR-MCNC: 279 MG/DL (ref 70–99)
GLUCOSE BLDC GLUCOMTR-MCNC: 294 MG/DL (ref 70–99)
GLUCOSE BLDC GLUCOMTR-MCNC: 351 MG/DL (ref 70–99)
GLUCOSE BLDC GLUCOMTR-MCNC: 78 MG/DL (ref 70–99)
GLUCOSE BLDC GLUCOMTR-MCNC: 89 MG/DL (ref 70–99)
INTERPRETATION ECG - MUSE: NORMAL
P AXIS - MUSE: 71 DEGREES
PR INTERVAL - MUSE: 164 MS
QRS DURATION - MUSE: 116 MS
QT - MUSE: 428 MS
QTC - MUSE: 465 MS
R AXIS - MUSE: 79 DEGREES
SYSTOLIC BLOOD PRESSURE - MUSE: NORMAL MMHG
T AXIS - MUSE: 64 DEGREES
VENTRICULAR RATE- MUSE: 71 BPM

## 2022-09-08 PROCEDURE — 99233 SBSQ HOSP IP/OBS HIGH 50: CPT | Performed by: PHYSICIAN ASSISTANT

## 2022-09-08 PROCEDURE — 120N000013 HC R&B IMCU

## 2022-09-08 PROCEDURE — 250N000013 HC RX MED GY IP 250 OP 250 PS 637: Performed by: PHYSICIAN ASSISTANT

## 2022-09-08 RX ORDER — ARIPIPRAZOLE 5 MG/1
10 TABLET ORAL DAILY
Status: DISCONTINUED | OUTPATIENT
Start: 2022-09-08 | End: 2022-09-09

## 2022-09-08 RX ADMIN — ACETAMINOPHEN 975 MG: 325 TABLET ORAL at 21:32

## 2022-09-08 RX ADMIN — ASPIRIN 81 MG: 81 TABLET, COATED ORAL at 09:27

## 2022-09-08 RX ADMIN — ACETAMINOPHEN 975 MG: 325 TABLET ORAL at 04:42

## 2022-09-08 RX ADMIN — ACETAMINOPHEN 975 MG: 325 TABLET ORAL at 13:19

## 2022-09-08 RX ADMIN — ATORVASTATIN CALCIUM 40 MG: 40 TABLET, FILM COATED ORAL at 21:32

## 2022-09-08 RX ADMIN — ARIPIPRAZOLE 10 MG: 5 TABLET ORAL at 09:27

## 2022-09-08 ASSESSMENT — ACTIVITIES OF DAILY LIVING (ADL)
ADLS_ACUITY_SCORE: 20

## 2022-09-08 NOTE — CONSULTS
Triage and Transition - Consult and Liaison     Joshua GARFIELD Davis  September 8, 2022    Session start: 9:25 AM  Session end: 9:45 AM  Session duration in minutes: 20 minutes  CPT utilized: 03688 - Psychotherapy (with patient) - 30 (16-37*) min  Patient was seen virtually ([employer] cart or other teleconferencing device).  Anticipated number of sessions or this episode of care: 1-4    Diagnosis:   Other Unspecified and Specified Bipolar and Related Disorder 296.80 (F31.9) Unspecified Bipolar and Related Disorder, present;      Plan/Recommendations:     Continue care coordination with care team.     Continue medical stabilization.    Reconsult psychiatry as needed. Team wants psych to follow-up on med adjustment by Dr. Htuchison and ensuring pt's diabetes is not affected.    Patient will not continue to be followed by this service.    Recommend starting Abilify 5 mg today (9/7) and then 10 mg tomorrow (9/8). Risks including worsening diabetes, cardiac arrhythmias, weight gain, movement disorder reviewed.  His enzalutamide is an inducer of metabolism for Abilify and so, he might ultimately require higher dosing of Abilify than otherwise.  For now, I am choosing Abilify to start given his brittle diabetes that has been apparently very difficult to manage with insulin.  Consider Depakote and lithium, but with his medical complexity, for now, I would still suggest trying a second generation antipsychotic with lower risk for diabetes worsening, such as Abilify.  QTc recheck was 460 after being slightly long at 495 earlier in the admission.     Klonopin 0.5 mg twice a day as needed for agitation or anxiety.  Did discuss with nursing staff there would be a fall risk with this if given that they will have to monitor closely for.  Also, I added hydroxyzine p.r.n. for anxiety and agitation additionally.  Further, will add olanzapine for more severe agitation or manic symptoms occurring overnight or in the evening.    Once medically  "cleared if still demonstrating geoffrey may need to consider transfer to [facility] psych    Reason for consult: Joshua is 72 year old White  male . Psychiatry consult was requested due to care team wanted psych to follow-up on med adjustment by Dr. Hutchison and ensuring pt's diabetes not affected. Patient was seen by [facility] team.     Presenting problem: Pt continues to display acute geoffrey, team wants to continue assessment with new abilify started.    Session Summary: Pt was agreeable to meet and reported managing his geoffrey and diabetes have been a struggle for him in the past. Pt reported no concerns at this time. Pt stated he \"Just wants to follow MD recommendations and hope to discharge from the hospital soon\". Pt displays poor insight into his mental health and believes he can discharge home today. Pt was informed that provider will need to clear him before he can go home. Pt believes his symptoms can be managed over a \"house call on Monday\". Pt talked often about his history of being in the hospital, [facility] and [facility].    Mental Status Exam   Affect: Other: talkative, goal directed, expansive  Appearance: Appropriate   Attention Span/Concentration: Attentive    Eye Contact: Engaged  Fund of Knowledge: Appropriate   Language /Speech Content: Fluent  Language /Speech Volume: Normal   Language /Speech Rate/Productions: Hyperverbal   Recent Memory: Intact  Remote Memory: Intact  Mood: Euphoric   Orientation:   Person: Yes   Place: Yes  Time of Day: Yes   Date: Yes   Situation (Do they understand why they are here?): Yes   Psychomotor Behavior: Hyperactive   Thought Content: Other: ruminating  Thought Form: Goal Directed    Current medications:   Current Facility-Administered Medications   Medication     acetaminophen (TYLENOL) tablet 975 mg     acyclovir (ZOVIRAX) tablet 400 mg     ARIPiprazole (ABILIFY) tablet 10 mg     aspirin EC tablet 81 mg     atorvastatin (LIPITOR) tablet 40 mg     clonazePAM (klonoPIN) " tablet 0.5 mg     glucose gel 15-30 g    Or     dextrose 50 % injection 25-50 mL    Or     glucagon injection 1 mg     enzalutamide (XTANDI) capsule 160 mg     hydrALAZINE (APRESOLINE) injection 10 mg     HYDROmorphone (DILAUDID) injection 0.2 mg     hydrOXYzine (ATARAX) tablet 25 mg    Or     hydrOXYzine (ATARAX) tablet 50 mg     insulin aspart (NovoLOG) injection (RAPID ACTING)     insulin aspart (NovoLOG) injection (RAPID ACTING)     insulin aspart (NovoLOG) injection (RAPID ACTING)     insulin aspart (NovoLOG) injection (RAPID ACTING)     insulin glargine (LANTUS PEN) injection 21 Units     Lidocaine (LIDOCARE) 4 % Patch 1 patch     lidocaine (LMX4) cream     lidocaine 1 % 0.1-1 mL     lidocaine patch in PLACE     Med Instruction - Transition from IV Insulin Infusion to Sub-Q Insulin     melatonin tablet 1 mg     naloxone (NARCAN) injection 0.2 mg    Or     naloxone (NARCAN) injection 0.4 mg    Or     naloxone (NARCAN) injection 0.2 mg    Or     naloxone (NARCAN) injection 0.4 mg     OLANZapine zydis (zyPREXA) ODT tab 5 mg     ondansetron (ZOFRAN ODT) ODT tab 4 mg    Or     ondansetron (ZOFRAN) injection 4 mg     oxyCODONE IR (ROXICODONE) half-tab 2.5 mg     Patient is already receiving mechanical prophylaxis     prochlorperazine (COMPAZINE) injection 5 mg    Or     prochlorperazine (COMPAZINE) tablet 5 mg    Or     prochlorperazine (COMPAZINE) suppository 12.5 mg     QUEtiapine (SEROquel) tablet 25 mg     senna-docusate (SENOKOT-S/PERICOLACE) 8.6-50 MG per tablet 1 tablet    Or     senna-docusate (SENOKOT-S/PERICOLACE) 8.6-50 MG per tablet 2 tablet     sodium chloride (PF) 0.9% PF flush 3 mL     sodium chloride (PF) 0.9% PF flush 3 mL              Therapeutic intervention and progress:  Therapeutic intervention consisted of building therapeutic rapport, active listening and validation. Patient is making progress towards treatment goals as evidenced by engagement in session.     Collateral information:    Reviewed chart and coordinated with care team.    GIO Wheeler, [facility]  Triage and Transition - Consult and Liaison   [phone]

## 2022-09-08 NOTE — CONSULTS
Triage and Transition- Consult and Liaison     Joshua Cannon  September 8, 2022      Psychiatry consult completed by Dr. Hutchison on 9/6 and 9/7      Plan/Recommendations if agreed upon by hospitalist and care team:   1.  Continue medical stabilization.  2.  Reconsult psychiatry as needed.  3.  Recommend starting Abilify 5 mg today (9/7) and then 10 mg tomorrow (9/8). Risks including worsening diabetes, cardiac arrhythmias, weight gain, movement disorder reviewed.  His enzalutamide is an inducer of metabolism for Abilify and so, he might ultimately require higher dosing of Abilify than otherwise.  For now, I am choosing Abilify to start given his brittle diabetes that has been apparently very difficult to manage with insulin.  Consider Depakote and lithium, but with his medical complexity, for now, I would still suggest trying a second generation antipsychotic with lower risk for diabetes worsening, such as Abilify.  QTc recheck was 460 after being slightly long at 495 earlier in the admission.   4.   Klonopin 0.5 mg twice a day as needed for agitation or anxiety.  Did discuss with nursing staff there would be a fall risk with this if given that they will have to monitor closely for.  Also, I added hydroxyzine p.r.n. for anxiety and agitation additionally.  Further, will add olanzapine for more severe agitation or manic symptoms occurring overnight or in the evening.  5. Once medically cleared if still demonstrating geoffrey may need to consider transfer to [facility] psych    Meliza Krueger [facility]  Triage and Transition - Consult and Liaison   [phone]

## 2022-09-08 NOTE — PROGRESS NOTES
[facility]    Hospitalist Progress Note    Assessment & Plan   Joshua Cannon is a 72 year old male who was admitted on 9/4/2022.     Past medical history significant for DM1 (previously thought to be DM2 until 2 wks PTA), hip pain and osteoarthritis, crohns disease with colectomy, prostate cancer, and HLD who was admitted to [facility] on 9/4/2022 with severe hyperglycemia c/w HHS, AMS, and manic like behavior.     Severe hyperglycemia consistent with HHS  Type 1 diabetes  Hypoglycemia  Historically DM2 on metformin and other medications and insulin. Recent workup at [facility] 8/16/22 revealed DM1 due to undetectable C peptide, metformin discontinued at that time. Insulin adjusted and he was recommended for insulin pump. Glucose in the -600s range. No anion gap but note increase in ketones. UA noninfectious. CMP, potassium, troponin, LA all WNL. CBC WNL. VBP with normal HCO3, hypoxia noted. Osmolality resulted as 318. C/w HHS picture. Given 1 L NS and started ordered for insulin gtt, however patient refused, required transfer to behavioral health section of ED for closer monitoring and as below dose of droperidol. Agreed to 10 units IV regular insulin to start and subsequently more agreeable and allowed initiation of insulin gtt, K, IV fluids.   *PTA Regimen:    --[facility] note from 8/16/22 indicted patient is taking Basaglar 21 units every morning and Novolog 14-21 units TID with meals (typically 16 units).    **9/8: Patient indicated that prandial insulin base dose was 12 but was adjusted based off glucose reading.    **Outpatient plan to proceed with ambulatory insulin pump (planned placement and education Thurs 9/8).  *Last HbA1c 8.6% 5/2022 and update HbA1c 6.8%.  Recent Labs   Lab 09/08/22  0806 09/08/22  0625 09/08/22  0444 09/08/22  0145 09/07/22  2144 09/07/22  1825   * 170* 78 89 256* 175*     **Blood glucose of 58 overnight/early morning 9/7.    -  Hypoglycemia protocol.  - Started Lantus 18 units 9/6, then increased to 21 units on 9/7.    - Medium intensity sliding sclae  - Prandial Novolog dose changed 9/7 due to overnight hypoglycemia:   --12 units with breakfast and lunch and 8 units with supper started on 9/7.      --Adjusted 9/8 with 15 units with breakfast and lunch and 8 units with supper.  - Glucose checks before each meal, at bedtime and at 2AM.       Altered mental status  Concern for manic type behavior  Head CT with soft tissue selling in posterior occipital scalp without skull fx. Mild age related changes. No acute intracranial abnormality.   Wife reports patient displaying confusion and geoffrey type behavior the past 2 days, driving 40mph [place] the shoulder of the freeway en route to the ED. No hx of mental status changes, geoffrey, or bipolar disorder. In the ED, the patient display very poor insight into situation, repeats himself and asks me to read [facility] records multiple times. Explains his dexcom readings. Declined insulin and fluids, security called as patient escalating. Given 1 dose droperidol by ED and moved to behavioral suite for locked area as patient consistently attempting to leave his ED room. Wife endorsed need for any means necessary to get him treatment for hyperglycemia. Full neuro exam is nonfocal.  *Brain MRI negative for acute intracranial finding  *Urine drug screen negative.  TSH WNL at 1.33.    - Redirect frequently, avoid escalating behaviors as able.  - Bedside attendant.  - Neuro checks Q 4 hours.  - No infectious signs.  - Fall precautions.  - Psychiatry consult appreciated:   --Seen by Dr. Hutchison 9/6 and 9/7.   --Agreeable to start Abilify and took 5 mg 9/7 and per Psych note plan to increase to 10 mg today.     --PRN Klonopin 0.5 mg BID available for agitation or anxiety as well as PRN Atarax.     --PRN Zyprexa for severe agitation or manic symptoms.     --May need to consider transfer to [facility].    - OT consult  "appreciated:   --SLUMS score of 22/30 (mild neurocognitive D/O). Limited by impulsiveness, decreased attention/concentration.   --Below baseline for ADL/IADL.    - PRN Seroquel and PRN Zyprexa available.       Elevated blood pressure (Improved)  No formal diagnosis of HTN.  SBP reading elevated this morning (9/7) >180 with a reading over 200.    - Monitor.    - PRN IV hydralazine available.      Hip pain, subacute  Hip osteoarthritis  Hip pain for years, however seen by PCP for 1 week worsening hip pain 8/31/22.  - APAP PRN.  - Lidoderm patch.      Skin lesions  Recently seen at [facility] and had multiple lesions excised and biopsies are in process.    - Clean these sites daily and apply Vaseline daily.       Other pertinent history and chronic stable diagnoses: Appropriate prior to admission medications will be resumed.   Chrohns disease s/p colectomy  Prostate cancer: Continued on PTA Xtandi.  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    Hypercholesterolemia: Resumed on PTA atorvastatin 40 mg every evening.       Recent COVID-19 infection 8/3/22, COVID recovered  - No need for special precautions.  - Did not receive any tx, recovered without issue.      Clinically Significant Risk Factors Present on Admission               # Overweight: Estimated body mass index is 26.24 kg/m  as calculated from the following:    Height as of this encounter: 1.854 m (6' 1\").    Weight as of this encounter: 90.2 kg (198 lb 14.4 oz).          Diet: Moderate Consistent Carb (60 g CHO per Meal) Diet     DVT Prophylaxis: Pneumatic Compression Devices   Castañeda Catheter: Not present  Central Lines: None  Cardiac Monitoring: None  Code Status: Full Code      Disposition Plan    Unclear with ongoing adjustment of insulin and management of geoffrey.         The patient's care was discussed with the Bedside Nurse and Patient.      The patient has been discussed with Dr. Martin, who " "agrees with the assessment and plan at this time.    Justin Gallo PA-C  [facility]  Securely message with the [employer] Display Web Console (learn more here)  Text page via [employer] Paging/Directory      Interval History   Patient was seated in a chair upon arrival and fully dressed.  He denied fever, chills, chest pain, shortness of breath or abdominal pain.  He has had no issues with urinating, actually said it has improved.  He has had more formed bowel movement.      Patient stated he is still having racing thoughts and was advised to bring this up with Psych.  We reviewed taking Abilify (\"blue pill\").  He stated he reviewed this medication with Psych and other people yesterday and is ok to take this.      We reviewed his blood glucose readings and adjustments of insulin.  He had mentioned that prandial insulin base dose was 12 but was adjusted based off glucose reading.  He agrees with the plan to be hospitalized with continued adjustment of insulin.  He also mentioned that the video visit with [facility] ambulatory insulin pump was not written in his calender correctly.      -Data reviewed today: I reviewed all new labs and imaging results over the last 24 hours. I personally reviewed no images or EKG's today.    Physical Exam   BP (!) 148/86 (BP Location: Left arm, Patient Position: Supine, Cuff Size: Adult Regular)   Pulse 67   Temp 97.5  F (36.4  C) (Oral)   Resp 18   Ht 1.854 m (6' 1\")   Wt 90.2 kg (198 lb 14.4 oz)   SpO2 98%   BMI 26.24 kg/m        Constitutional: Patient was awake and fully dressed and seated in a chair.  He was still hyperverbal but cooperative and alert.  He was in no distress.    ENT: Normocephalic, without obvious abnormality, atraumatic, oral pharynx with MMM, tonsils without erythema or exudates.  Neck: Supple, symmetrical, trachea midline, no adenopathy.  Pulmonary: No increased work of breathing, good air exchange, CTA bilaterally, no crackles or " wheezing.  Cardiovascular: R/R/R, normal S1 and S2, no S3 or S4, and no murmur noted.  GI: Bowel sounds present, soft, non-distended, non-tender.  Skin/Integumen: Recently excised and biopsied skin lesions noted on the upper extremities.    Neuro: CN II-XII grossly intact.  Psych:  Alert and oriented x 3. Normal to elevated affect.  Extremities: No lower extremity edema noted, and calves are non-TTP bilaterally.       Medications     - MEDICATION INSTRUCTIONS -       - MEDICATION INSTRUCTIONS -         acetaminophen  975 mg Oral Q8H     ARIPiprazole  10 mg Oral Daily     aspirin  81 mg Oral Daily     atorvastatin  40 mg Oral QPM     enzalutamide  160 mg Oral Daily     insulin aspart  15 Units Subcutaneous BID w/meals     insulin aspart  8 Units Subcutaneous Daily with supper     insulin aspart  1-7 Units Subcutaneous TID AC     insulin aspart  1-5 Units Subcutaneous At Bedtime     insulin glargine  21 Units Subcutaneous QAM AC     lidocaine  1 patch Transdermal Q24H     lidocaine   Transdermal Q8H LISSY     sodium chloride (PF)  3 mL Intracatheter Q8H       Data   Recent Labs   Lab 09/08/22  0806 09/08/22  0625 09/08/22  0444 09/07/22  0832 09/07/22  0546 09/06/22  0648 09/06/22  0616 09/05/22  0642 09/05/22  0543 09/04/22 2112 09/04/22  1549   WBC  --   --   --   --   --   --  6.0  --  4.7  --  7.6   HGB  --   --   --   --   --   --  13.8  --  12.2*  --  14.6   MCV  --   --   --   --   --   --  92  --  89  --  91   PLT  --   --   --   --   --   --  200  --  184  --  219   NA  --   --   --   --  139  --  138  --  141   < > 133   POTASSIUM  --   --   --   --  4.2  --  3.7  --  3.6   < > 4.4   CHLORIDE  --   --   --   --  105  --  108  --  110*   < > 100   CO2  --   --   --   --  27  --  25  --  25   < > 22   BUN  --   --   --   --  10  --  7  --  9   < > 15   CR  --   --   --   --  0.64*  --  0.57*  --  0.59*   < > 0.70   ANIONGAP  --   --   --   --  7  --  5  --  6   < > 11   MATTHEW  --   --   --   --  9.2  --  8.9  --   8.9   < > 9.7   * 170* 78   < > 235*   < > 156*   < > 150*  138*   < > 606*   ALBUMIN  --   --   --   --   --   --   --   --  3.1*  --  4.0   PROTTOTAL  --   --   --   --   --   --   --   --  5.7*  --  7.3   BILITOTAL  --   --   --   --   --   --   --   --  0.6  --  0.7   ALKPHOS  --   --   --   --   --   --   --   --  73  --  102   ALT  --   --   --   --   --   --   --   --  15  --  16   AST  --   --   --   --   --   --   --   --  10  --  15    < > = values in this interval not displayed.       No results found for this or any previous visit (from the past 24 hour(s)).

## 2022-09-08 NOTE — PLAN OF CARE
AxOx4, fairly hyperverbal and rambles frequently. Often will hyper-focus.  Tele = SB/SR w/ BBB.    VSS on RA. Independent in room. Mod cho diet. Up to bathroom with adequate output. No BM this shift.     PIV x1 SL.     , 89. 1 Jello given per patient request. Recheck at 0500 = 78. 1 cup of applesauce given. Recheck at 0630 = 170.    Continue plan of care.

## 2022-09-08 NOTE — PLAN OF CARE
Pt A&OX4, mood/behavior is manic, hyper-verbal and rambling. VSS on RA ex high BP's (149/98, 170/105,162/96). Tele SR/SB. Lungs clear. Ambulating independent in room. BS+, BM+. Tolerating mod carb diet. Denies pain. Abilify increased to 10mg, psych consult completed and Klonopin 0.5mg PRN recommended for agitation/anxiety. PIV SL on L hand. Refused lidocaine patches. Frequently getting up and leaving room for walks. Pt rambling and hyper-fixed on specific things. BG fluctuating between meals, continue to monitor closely.

## 2022-09-09 LAB
GLUCOSE BLDC GLUCOMTR-MCNC: 142 MG/DL (ref 70–99)
GLUCOSE BLDC GLUCOMTR-MCNC: 197 MG/DL (ref 70–99)
GLUCOSE BLDC GLUCOMTR-MCNC: 254 MG/DL (ref 70–99)
GLUCOSE BLDC GLUCOMTR-MCNC: 306 MG/DL (ref 70–99)
GLUCOSE BLDC GLUCOMTR-MCNC: 343 MG/DL (ref 70–99)
GLUCOSE BLDC GLUCOMTR-MCNC: 352 MG/DL (ref 70–99)

## 2022-09-09 PROCEDURE — 250N000013 HC RX MED GY IP 250 OP 250 PS 637: Performed by: PHYSICIAN ASSISTANT

## 2022-09-09 PROCEDURE — 99207 PR NO CHARGE LOS: CPT

## 2022-09-09 PROCEDURE — 99233 SBSQ HOSP IP/OBS HIGH 50: CPT | Performed by: PHYSICIAN ASSISTANT

## 2022-09-09 PROCEDURE — 120N000013 HC R&B IMCU

## 2022-09-09 RX ORDER — ARIPIPRAZOLE 5 MG/1
15 TABLET ORAL DAILY
Status: DISCONTINUED | OUTPATIENT
Start: 2022-09-10 | End: 2022-09-13

## 2022-09-09 RX ADMIN — LIDOCAINE 1 PATCH: 560 PATCH PERCUTANEOUS; TOPICAL; TRANSDERMAL at 23:45

## 2022-09-09 RX ADMIN — ACETAMINOPHEN 975 MG: 325 TABLET ORAL at 04:20

## 2022-09-09 RX ADMIN — ATORVASTATIN CALCIUM 40 MG: 40 TABLET, FILM COATED ORAL at 20:53

## 2022-09-09 RX ADMIN — ACETAMINOPHEN 975 MG: 325 TABLET ORAL at 20:53

## 2022-09-09 RX ADMIN — ARIPIPRAZOLE 10 MG: 5 TABLET ORAL at 08:16

## 2022-09-09 RX ADMIN — ASPIRIN 81 MG: 81 TABLET, COATED ORAL at 08:16

## 2022-09-09 ASSESSMENT — ACTIVITIES OF DAILY LIVING (ADL)
ADLS_ACUITY_SCORE: 20

## 2022-09-09 NOTE — PLAN OF CARE
Length of stay: 9/4/22 Day 5  CODE: Full  Primary Problem: AMS/Confusion  Summary: Patient has been having high blood pressures this shift, has PRN hydralazine SBP>180 but has not needed this shift. Still having hyper verbal, hyper-focused and can be easily excitable. Psychiatry increased Abilify this shift to 15mg in the am. Continue to monitor cooperative with cares.     Feeding/Fluids: Mod carb diet thin liquids pills whole      Analgesia/ Anticoagulation: Benny pain      Skin: WNL     Telemetry & Rhythm: No tele     Emotional Needs/ Neuro status: AxOx4     Resp Needs/Weaning: Room air lungs clear    HOB/ Activity: Independent in room      Urologic & Bowel: Continent of B/B BM +    Glycemic Control: sliding scale insulin  plus set units with meals and at bedtime    Invasive Lines: PIV x 1     Discharge: When medically stable     Problem: Hyperglycemia  Goal: Blood Glucose Level Within Targeted Range  Outcome: Ongoing, Progressing   Continue to monitor and adjust insulin
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
## 2022-09-09 NOTE — PLAN OF CARE
AxOx4, mostly hyper-verbal, some rambling, some hyper-focusing. Easily excitable.    VSS on RA. Independent in room. Mod cho diet. Up to bathroom with adequate output. No BM this shift.     PIV x1 SL.     , 142.    Continue plan of care.  If geoffrey continues, pt may need geripsych placement per psych note.

## 2022-09-09 NOTE — CONSULTS
Patient has now received Abilify 5mg x1 and Abilify 10mg x2. He has not required his PRN Klonopin, PRN Atarax, or PRN Zyprexa.     Nursing notes reports patient continues to exhibit symptoms of geoffrey such as easily excitable, hyper-verbal, and hyper-fixated on specific things.     Due to continued manic symptoms and enzalutamide inducing metabolism of Abilify, I think it is reasonable to increase Abilify to 15mg daily as the recommended starting dose for acute geoffrey is often 10-15mg.     Would also encourage nursing to use PRN Seroquel at bedtime is patient is not sleeping well.

## 2022-09-10 LAB
GLUCOSE BLDC GLUCOMTR-MCNC: 219 MG/DL (ref 70–99)
GLUCOSE BLDC GLUCOMTR-MCNC: 224 MG/DL (ref 70–99)
GLUCOSE BLDC GLUCOMTR-MCNC: 250 MG/DL (ref 70–99)
GLUCOSE BLDC GLUCOMTR-MCNC: 339 MG/DL (ref 70–99)
GLUCOSE BLDC GLUCOMTR-MCNC: 350 MG/DL (ref 70–99)
GLUCOSE BLDC GLUCOMTR-MCNC: 75 MG/DL (ref 70–99)

## 2022-09-10 PROCEDURE — 120N000013 HC R&B IMCU

## 2022-09-10 PROCEDURE — 250N000013 HC RX MED GY IP 250 OP 250 PS 637: Performed by: PHYSICIAN ASSISTANT

## 2022-09-10 PROCEDURE — 99233 SBSQ HOSP IP/OBS HIGH 50: CPT | Performed by: HOSPITALIST

## 2022-09-10 RX ADMIN — ACETAMINOPHEN 975 MG: 325 TABLET ORAL at 20:06

## 2022-09-10 RX ADMIN — ATORVASTATIN CALCIUM 40 MG: 40 TABLET, FILM COATED ORAL at 20:06

## 2022-09-10 RX ADMIN — ASPIRIN 81 MG: 81 TABLET, COATED ORAL at 08:28

## 2022-09-10 RX ADMIN — ACETAMINOPHEN 975 MG: 325 TABLET ORAL at 05:24

## 2022-09-10 RX ADMIN — ACETAMINOPHEN 975 MG: 325 TABLET ORAL at 11:49

## 2022-09-10 RX ADMIN — ARIPIPRAZOLE 15 MG: 5 TABLET ORAL at 08:28

## 2022-09-10 ASSESSMENT — ACTIVITIES OF DAILY LIVING (ADL)
ADLS_ACUITY_SCORE: 20
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
## 2022-09-10 NOTE — PLAN OF CARE
A/Ox4, hyperverbal. Cooperative. VSS on RA ex slightly hypertensive. Denies pain. Neuros WNL. Blood sugars continue to be in 300's despite insulin adjustment, MD aware. Good appetite, voiding adequately. Up independent in room. Wife at bedside most of day. Discharge pending.

## 2022-09-10 NOTE — PLAN OF CARE
Goal Outcome Evaluation:    Plan of Care Reviewed With: patient     Overall Patient Progress: no change    Pt. Alert and oriented x4, anxious/pressured/hyper-verbal speech, . Vital signs stable on RA. Assist: Independent in room. Tolerating Regular - mod carb diet. Lung sounds clear. Bowel sounds +, passing flatus. BM 9/9, adequate urine output. Pain managed with PRN Tylenol, requested Lidocaine patch to be put on that was refused earlier in the day. Denies nausea.

## 2022-09-11 LAB
ANION GAP SERPL CALCULATED.3IONS-SCNC: 6 MMOL/L (ref 3–14)
BUN SERPL-MCNC: 13 MG/DL (ref 7–30)
CALCIUM SERPL-MCNC: 9.4 MG/DL (ref 8.5–10.1)
CHLORIDE BLD-SCNC: 105 MMOL/L (ref 94–109)
CO2 SERPL-SCNC: 26 MMOL/L (ref 20–32)
CREAT SERPL-MCNC: 0.75 MG/DL (ref 0.66–1.25)
GFR SERPL CREATININE-BSD FRML MDRD: >90 ML/MIN/1.73M2
GLUCOSE BLD-MCNC: 167 MG/DL (ref 70–99)
GLUCOSE BLDC GLUCOMTR-MCNC: 101 MG/DL (ref 70–99)
GLUCOSE BLDC GLUCOMTR-MCNC: 175 MG/DL (ref 70–99)
GLUCOSE BLDC GLUCOMTR-MCNC: 320 MG/DL (ref 70–99)
GLUCOSE BLDC GLUCOMTR-MCNC: 340 MG/DL (ref 70–99)
GLUCOSE BLDC GLUCOMTR-MCNC: 61 MG/DL (ref 70–99)
GLUCOSE BLDC GLUCOMTR-MCNC: 70 MG/DL (ref 70–99)
POTASSIUM BLD-SCNC: 4 MMOL/L (ref 3.4–5.3)
SODIUM SERPL-SCNC: 137 MMOL/L (ref 133–144)

## 2022-09-11 PROCEDURE — 36415 COLL VENOUS BLD VENIPUNCTURE: CPT | Performed by: HOSPITALIST

## 2022-09-11 PROCEDURE — 250N000013 HC RX MED GY IP 250 OP 250 PS 637: Performed by: PHYSICIAN ASSISTANT

## 2022-09-11 PROCEDURE — 82310 ASSAY OF CALCIUM: CPT | Performed by: HOSPITALIST

## 2022-09-11 PROCEDURE — 120N000001 HC R&B MED SURG/OB

## 2022-09-11 PROCEDURE — 99232 SBSQ HOSP IP/OBS MODERATE 35: CPT | Performed by: HOSPITALIST

## 2022-09-11 RX ADMIN — ACETAMINOPHEN 975 MG: 325 TABLET ORAL at 19:36

## 2022-09-11 RX ADMIN — ARIPIPRAZOLE 15 MG: 5 TABLET ORAL at 07:55

## 2022-09-11 RX ADMIN — ATORVASTATIN CALCIUM 40 MG: 40 TABLET, FILM COATED ORAL at 19:36

## 2022-09-11 RX ADMIN — ACETAMINOPHEN 975 MG: 325 TABLET ORAL at 08:45

## 2022-09-11 RX ADMIN — ASPIRIN 81 MG: 81 TABLET, COATED ORAL at 07:55

## 2022-09-11 ASSESSMENT — ACTIVITIES OF DAILY LIVING (ADL)
ADLS_ACUITY_SCORE: 20
ADLS_ACUITY_SCORE: 24
ADLS_ACUITY_SCORE: 20
ADLS_ACUITY_SCORE: 24
ADLS_ACUITY_SCORE: 20

## 2022-09-11 NOTE — PLAN OF CARE
A/Ox4, very talkative. VSS on RA, running hypertensive. Independent. Mod carb diet. Pt denies pain, refused scheduled 0430 tylenol and lidocaine patch. Continent BB, has prostate cancer, wears pads d/t some leaking. PIVx1 saline locked. BG dipped low a couple times, pudding and jello given which brought his levels back up.

## 2022-09-11 NOTE — PROGRESS NOTES
A/Ox4, VSS on RA. Tylenol given x1 for bilateral hip and knee pain. Neuros WNL ex hyperverbal, some disorganized thinking. Up independent, good appetite. Blood sugars in 300's, MD aware. Transferred to [facility] for continued care.
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
## 2022-09-11 NOTE — PROGRESS NOTES
Patients BG at 1745 was 61, Notified MD. Pt refused snack and wants to wait for supper at 6 pm.    .

## 2022-09-11 NOTE — PLAN OF CARE
Goal Outcome Evaluation:           DATE & TIME:9/11/22 1400- 1930 IMC Transfer   Cognitive Concerns/ Orientation : A&O x 4,   BEHAVIOR & AGGRESSION TOOL COLOR:green  CIWA SCORE: NA  ABNL VS/O2:VSS, on RA  MOBILITY: independent  PAIN MANAGMENT:denies  DIET:low carb  BOWEL/BLADDER:continent, incontinent at times  DRAIN/DEVICES: PIV -SL  TELEMETRY RHYTHM: NA  SKIN: WNL  TESTS/PROCEDURES: none   ABNORMAL LABS: BG 61, scheduled and sliding scale insulin  D/C DATE:pending progress  OTHER IMPORTANT INFO:[facility] transfer, MD adjusting insulin dose.Neuro checks intact , Q 4hr Neur checks .On chemo precautions

## 2022-09-11 NOTE — PROGRESS NOTES
MD Notification    Notified Person: MD  Notified Person Name:Dr. Martin  Notification Date/Time:9/11/22 550pm  Notification Interaction:1234ENTER messaging  Purpose of Notification:BG 61, scheduled  for 12 units of  Novolog with supper.  Orders Received:yes  Comments:received orders to give 8 units of novolog instead of 12 units

## 2022-09-12 LAB
GLUCOSE BLDC GLUCOMTR-MCNC: 105 MG/DL (ref 70–99)
GLUCOSE BLDC GLUCOMTR-MCNC: 183 MG/DL (ref 70–99)
GLUCOSE BLDC GLUCOMTR-MCNC: 280 MG/DL (ref 70–99)
GLUCOSE BLDC GLUCOMTR-MCNC: 309 MG/DL (ref 70–99)
GLUCOSE BLDC GLUCOMTR-MCNC: 60 MG/DL (ref 70–99)
GLUCOSE BLDC GLUCOMTR-MCNC: 91 MG/DL (ref 70–99)
SARS-COV-2 RNA RESP QL NAA+PROBE: NEGATIVE

## 2022-09-12 PROCEDURE — 258N000001 HC RX 258: Performed by: PHYSICIAN ASSISTANT

## 2022-09-12 PROCEDURE — U0005 INFEC AGEN DETEC AMPLI PROBE: HCPCS | Performed by: HOSPITALIST

## 2022-09-12 PROCEDURE — 250N000013 HC RX MED GY IP 250 OP 250 PS 637: Performed by: PHYSICIAN ASSISTANT

## 2022-09-12 PROCEDURE — 250N000012 HC RX MED GY IP 250 OP 636 PS 637: Performed by: HOSPITALIST

## 2022-09-12 PROCEDURE — 99232 SBSQ HOSP IP/OBS MODERATE 35: CPT | Performed by: HOSPITALIST

## 2022-09-12 PROCEDURE — 120N000001 HC R&B MED SURG/OB

## 2022-09-12 RX ADMIN — ARIPIPRAZOLE 15 MG: 5 TABLET ORAL at 08:22

## 2022-09-12 RX ADMIN — ATORVASTATIN CALCIUM 40 MG: 40 TABLET, FILM COATED ORAL at 21:45

## 2022-09-12 RX ADMIN — DEXTROSE MONOHYDRATE 50 ML: 25 INJECTION, SOLUTION INTRAVENOUS at 18:22

## 2022-09-12 RX ADMIN — ASPIRIN 81 MG: 81 TABLET, COATED ORAL at 08:22

## 2022-09-12 RX ADMIN — ACETAMINOPHEN 975 MG: 325 TABLET ORAL at 21:45

## 2022-09-12 RX ADMIN — INSULIN ASPART 22 UNITS: 100 INJECTION, SOLUTION INTRAVENOUS; SUBCUTANEOUS at 10:28

## 2022-09-12 RX ADMIN — ACETAMINOPHEN 975 MG: 325 TABLET ORAL at 13:40

## 2022-09-12 RX ADMIN — ACETAMINOPHEN 975 MG: 325 TABLET ORAL at 04:07

## 2022-09-12 ASSESSMENT — ACTIVITIES OF DAILY LIVING (ADL)
ADLS_ACUITY_SCORE: 20
ADLS_ACUITY_SCORE: 18
ADLS_ACUITY_SCORE: 18
ADLS_ACUITY_SCORE: 20
ADLS_ACUITY_SCORE: 18
ADLS_ACUITY_SCORE: 20

## 2022-09-12 NOTE — PROGRESS NOTES
"[id number] Report received from nurse Valero, patient observed sleeping, chemo precautions sign on room door, safety measures in place and call light in reach.Anny Paulson RN    0830 Patient swabbed for COVID per ordered, patient tolerated process well.Anny Paulson RN    0929 Patient requesting to have shower, no issues, severe pain or distress noted.Anny Paulson RN    1002 MD at bedside, advised patient is in the shower, advised patient is doing well, just making sure blood sugar levels are controlled before discharge.Anny Paulson RN    1308 Patient just completed his psych consult via zoom. Patient advised that he requested a face to face conversation so the interviewer can \"read body language\". Patient advised that he is disagreeing with the second diagnosis of manic and would like the MDs to speak with some of the staff who has had him as a patient to attest to his mental capacity.Anny Paulson RN    1310 Dr. Martin inquiring about patient lunch tray and insulin, advised patient just completed psy consult and just ordered lunch, patient advised to call when tray arrives to have blood sugar assessed.Anny Paulson RN    1800 Patient on the phone with endocrinologist from the [facility].Anny Paulson RN    6070 Report provided to nurse Humphreys.Anny Paulson RN    "

## 2022-09-12 NOTE — PLAN OF CARE
DATE & TIME:9/11/22 1900-0730   Cognitive Concerns/ Orientation : A&O x 4,   BEHAVIOR & AGGRESSION TOOL COLOR: Green  CIWA SCORE: NA  ABNL VS/O2: VSS on RA  MOBILITY: independent  PAIN MANAGMENT:denies  DIET: Mod CHO  BOWEL/BLADDER:continent, incontinent at times  DRAIN/DEVICES: PIV -SL  TELEMETRY RHYTHM: NA  SKIN: WNL  TESTS/PROCEDURES: none   ABNORMAL LABS: BG 70, 2 jello given, 91 (did not want a snack overnight). INR 1.15  D/C DATE: pending progress  OTHER IMPORTANT INFO: IMC transfer, MD adjusting insulin dose.Neuro checks intact , on Q 4hr Neur checks .
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
## 2022-09-12 NOTE — PLAN OF CARE
Goal Outcome Evaluation:    Plan of Care Reviewed With: patient, spouse     Overall Patient Progress: improving    Outcome Evaluation: Patient more calm and able to speak without rapid speech. MRI completed. Patient completed psych consult via secure internet connection. Blood sugar continue to be an issue, adjustments made today by MD.    Cognitive Concerns/ Orientation : A&O x 4,   BEHAVIOR & AGGRESSION TOOL COLOR: Green  CIWA SCORE: NA  ABNL VS/O2: VSS on RA  MOBILITY: Independent  PAIN MANAGMENT:denies most of the time controlled with lidocaine patch and scheduled tylenol.  DIET: Mod CHO  BOWEL/BLADDER:Continent, incontinent at times (dribbling urine).  DRAIN/DEVICES: Left hand, PIV -SL  TELEMETRY RHYTHM: NA  SKIN: Rash on abdomen (blotchy).  TESTS/PROCEDURES: none   ABNORMAL LABS:  and 309, ate all his breakfast and lunch, wanted dinner at 1800. Blood Glucose 60, dextrose protocol initiated, patient repeat blood glucose 180, no issues noted.  D/C DATE: 9/14/2022 for [facility] appointment on 9/15/22 with diabetes educator.  OTHER IMPORTANT INFO: Psych consult completed today and insulin dose ranges adjusted. Neuro checks intact, on Q 4hr Neur checks .    Problem: Plan of Care - These are the overarching goals to be used throughout the patient stay.    Goal: Plan of Care Review/Shift Note  Description: The Plan of Care Review/Shift note should be completed every shift.  The Outcome Evaluation is a brief statement about your assessment that the patient is improving, declining, or no change.  This information will be displayed automatically on your shift note.  Outcome: Ongoing, Progressing  Flowsheets (Taken 9/12/2022 7252)  Plan of Care Reviewed With:    patient    spouse  Outcome Evaluation: Patient more calm and able to speak without rapid speech. MRI completed. Patient completed psych consult via secure internet connection. Blood sugar continue to be an issue, adjustments made today by MD.  Overall  "Patient Progress: improving  Goal: Patient-Specific Goal (Individualized)  Description: You can add care plan individualizations to a care plan. Examples of Individualization might be:  \"Parent requests to be called daily at 9am for status\", \"I have a hard time hearing out of my right ear\", or \"Do not touch me to wake me up as it startles me\".  Outcome: Ongoing, Progressing  Goal: Absence of Hospital-Acquired Illness or Injury  Outcome: Ongoing, Progressing  Intervention: Identify and Manage Fall Risk  Recent Flowsheet Documentation  Taken 9/12/2022 0830 by Anny Palomo RN  Safety Promotion/Fall Prevention:    nonskid shoes/slippers when out of bed    clutter free environment maintained    chemotherapeutic precautions  Intervention: Prevent and Manage VTE (Venous Thromboembolism) Risk  Recent Flowsheet Documentation  Taken 9/12/2022 0830 by Anny Palomo RN  VTE Prevention/Management: (Advised he walks alot.)    SCDs (sequential compression devices) off    patient refused intervention  Goal: Optimal Comfort and Wellbeing  Outcome: Ongoing, Progressing  Intervention: Monitor Pain and Promote Comfort  Recent Flowsheet Documentation  Taken 9/12/2022 1340 by Anny Palomo RN  Pain Management Interventions: medication (see MAR)  Goal: Readiness for Transition of Care  Outcome: Ongoing, Progressing  Flowsheets (Taken 9/12/2022 1658)  Anticipated Changes Related to Illness: inability to care for self  Transportation Anticipated: (Coordinated transfer or with Family.) other (see comments)  Concerns to be Addressed: all concerns addressed in this encounter  Intervention: Mutually Develop Transition Plan  Recent Flowsheet Documentation  Taken 9/12/2022 1658 by Anny Palomo RN  Anticipated Changes Related to Illness: inability to care for self  Transportation Anticipated: (Coordinated transfer or with Family.) other (see comments)  Concerns to be Addressed: all concerns addressed in this " encounter     Problem: Risk for Delirium  Goal: Optimal Coping  Outcome: Ongoing, Progressing  Goal: Improved Behavioral Control  Outcome: Ongoing, Progressing  Goal: Improved Attention and Thought Clarity  Outcome: Ongoing, Progressing  Goal: Improved Sleep  Outcome: Ongoing, Progressing     Problem: Pain Acute  Goal: Acceptable Pain Control and Functional Ability  Outcome: Ongoing, Progressing  Intervention: Develop Pain Management Plan  Recent Flowsheet Documentation  Taken 9/12/2022 1340 by Anny Palomo, RN  Pain Management Interventions: medication (see MAR)  Intervention: Prevent or Manage Pain  Recent Flowsheet Documentation  Taken 9/12/2022 0830 by Anny Palomo RN  Medication Review/Management: medications reviewed     Problem: Fall Injury Risk  Goal: Absence of Fall and Fall-Related Injury  Outcome: Ongoing, Progressing  Intervention: Identify and Manage Contributors  Recent Flowsheet Documentation  Taken 9/12/2022 0830 by Anny Palomo, RN  Medication Review/Management: medications reviewed  Intervention: Promote Injury-Free Environment  Recent Flowsheet Documentation  Taken 9/12/2022 0830 by Anny Palomo, RN  Safety Promotion/Fall Prevention:    nonskid shoes/slippers when out of bed    clutter free environment maintained    chemotherapeutic precautions     Problem: Hyperglycemia  Goal: Blood Glucose Level Within Targeted Range  Outcome: Ongoing, Progressing

## 2022-09-12 NOTE — CONSULTS
"Triage and Transition - Consult and Liaison     Joshua Cannon  September 12, 2022    Session start: 11:59 am  Session end: 12:47 pm  Session duration in minutes: 48  CPT utilized: 83791 - Psychotherapy (with patient) - 45 (38-52*) min  Patient was seen virtually ([employer] cart or other teleconferencing device).  Anticipated number of sessions or this episode of care: 1-4    Diagnosis:   Other Unspecified and Specified Bipolar and Related Disorder 296.80 (F31.9) Unspecified Bipolar and Related Disorder, present;    Plan/Recommendations:     Continue care coordination.    Family requesting to speak with psych. provider-has many medication concerns-Pt wife Rhianna [phone] requesting callback.    Left message for Psych. Provider.    Once medically cleared, if still demonstrating june may need to consider transfer to [facility] psych    Maintain current transition plan per care team.    Please re-consult as needed.    Reason for consult: Joshua is 72 year old White  male . Psychiatry consult was requested due to \"june f/u-family request\". Patient was seen by [facility] Consult & Liaison team.     Presenting problem: June sx    Session Summary: Pt agreeable to meet via  today.  Pt wife Rhianna in room, pt verbal agreed to allow her in room.  Pt sitting in chair next to his wife, gathered chairs, set up.  Pt states his mood as \"I am feeling a bit better\".    Pt still presenting with june sx, however may be a tad improved on the talkative, and increased in goal directed activity.  Pt bringing up several goals, has papers for a new \"company he wants to start\", in \"mythbusting\" and \"I am writing a book, I started it right after covid, you want to see it, (paper writing), some scattered dates.  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  Pt not as elevated as the last time clinician visited pt, however still expansive.      Per chart review pt with several provider consults " "and med reviews, per last note by provider A.L 9/9 was just 2 days ago.  Pt wife requesting to speak to psych provider she states \"Pt had covid 8/4, then geoffrey sx started around 8/26, read in article it could be schizophrenia\", per pt wife.  Pt denies any AH/VH, upon inquiry. Pt states\"I just want to reiterate my sister actually does not have bipolar as I previously reported, I talked to her\", pt wife confirms.  Per chart review, noted OT assessed pt with  SLUMS of 22/30.      Pt wife with several sporadic worries regarding \"pt driving, pt education, pt meds, sx, journal articles\".    Pt still perseverative on \"[facility] diabetic education on Thursday\", pt and wife asking for a \" education\".  Pt appears lack I&J into severity of MH. Pt perseverative on medical condition and regulation.    Will ask provider to see pt per family request.      Mental Status Exam   Affect: Other: expansive, goal directed  Appearance: Appropriate   Attention Span/Concentration: Attentive    Eye Contact: Engaged  Fund of Knowledge: Other: goal directed, disorganized noted a SLUMS of 22/30 per OT and pt chart noting.  Language /Speech Content: Fluent  Language /Speech Volume: Normal   Language /Speech Rate/Productions: Hyperverbal   Recent Memory: Intact  Remote Memory: Variable  Mood: Anxious   Orientation:   Person: Yes   Place: Yes  Time of Day: Yes   Date: Yes   Situation (Do they understand why they are here?): Yes   Psychomotor Behavior: Other: talkative   Thought Content: Other: perseverative, expansive, goal directed, disorganized  Thought Form: Flight of Ideas, Goal Directed and Obsessive/Perseverative    Current medications: Per EHR  Current Facility-Administered Medications   Medication     acetaminophen (TYLENOL) tablet 975 mg     acyclovir (ZOVIRAX) tablet 400 mg     ARIPiprazole (ABILIFY) tablet 15 mg     aspirin EC tablet 81 mg     atorvastatin (LIPITOR) tablet 40 mg     clonazePAM (klonoPIN) tablet 0.5 mg     glucose gel 15-30 " g    Or     dextrose 50 % injection 25-50 mL    Or     glucagon injection 1 mg     enzalutamide (XTANDI) capsule 160 mg     hydrALAZINE (APRESOLINE) injection 10 mg     HYDROmorphone (DILAUDID) injection 0.2 mg     hydrOXYzine (ATARAX) tablet 25 mg    Or     hydrOXYzine (ATARAX) tablet 50 mg     insulin aspart (NovoLOG) injection (RAPID ACTING)     insulin aspart (NovoLOG) injection (RAPID ACTING)     insulin aspart (NovoLOG) injection (RAPID ACTING)     insulin aspart (NovoLOG) injection (RAPID ACTING)     insulin aspart (NovoLOG) injection (RAPID ACTING)     insulin glargine (LANTUS PEN) injection 28 Units     Lidocaine (LIDOCARE) 4 % Patch 1 patch     lidocaine (LMX4) cream     lidocaine 1 % 0.1-1 mL     lidocaine patch in PLACE     melatonin tablet 1 mg     naloxone (NARCAN) injection 0.2 mg    Or     naloxone (NARCAN) injection 0.4 mg    Or     naloxone (NARCAN) injection 0.2 mg    Or     naloxone (NARCAN) injection 0.4 mg     OLANZapine zydis (zyPREXA) ODT tab 5 mg     ondansetron (ZOFRAN ODT) ODT tab 4 mg    Or     ondansetron (ZOFRAN) injection 4 mg     oxyCODONE IR (ROXICODONE) half-tab 2.5 mg     Patient is already receiving mechanical prophylaxis     prochlorperazine (COMPAZINE) injection 5 mg    Or     prochlorperazine (COMPAZINE) tablet 5 mg    Or     prochlorperazine (COMPAZINE) suppository 12.5 mg     QUEtiapine (SEROquel) tablet 25 mg     senna-docusate (SENOKOT-S/PERICOLACE) 8.6-50 MG per tablet 1 tablet    Or     senna-docusate (SENOKOT-S/PERICOLACE) 8.6-50 MG per tablet 2 tablet     sodium chloride (PF) 0.9% PF flush 3 mL     sodium chloride (PF) 0.9% PF flush 3 mL         Therapeutic intervention and progress:  Therapeutic intervention consisted of building therapeutic rapport, active listening, validation, thought reframing and CBT concepts.     Collateral information:   Reviewed chart and coordinated with wife Rhianna [phone], coordinated with psych provider, dorita Johnson  Марина MSW, LGSW  Triage and Transition - Consult and Liaison   [phone]

## 2022-09-13 ENCOUNTER — APPOINTMENT (OUTPATIENT)
Dept: OCCUPATIONAL THERAPY | Facility: CLINIC | Age: 72
DRG: 637 | End: 2022-09-13
Payer: MEDICARE

## 2022-09-13 LAB
GLUCOSE BLDC GLUCOMTR-MCNC: 287 MG/DL (ref 70–99)
GLUCOSE BLDC GLUCOMTR-MCNC: 308 MG/DL (ref 70–99)
GLUCOSE BLDC GLUCOMTR-MCNC: 417 MG/DL (ref 70–99)
GLUCOSE BLDC GLUCOMTR-MCNC: 419 MG/DL (ref 70–99)
GLUCOSE BLDC GLUCOMTR-MCNC: 528 MG/DL (ref 70–99)
GLUCOSE BLDC GLUCOMTR-MCNC: 536 MG/DL (ref 70–99)
GLUCOSE BLDC GLUCOMTR-MCNC: 85 MG/DL (ref 70–99)

## 2022-09-13 PROCEDURE — 250N000013 HC RX MED GY IP 250 OP 250 PS 637: Performed by: HOSPITALIST

## 2022-09-13 PROCEDURE — 120N000001 HC R&B MED SURG/OB

## 2022-09-13 PROCEDURE — 99232 SBSQ HOSP IP/OBS MODERATE 35: CPT | Performed by: HOSPITALIST

## 2022-09-13 PROCEDURE — 99232 SBSQ HOSP IP/OBS MODERATE 35: CPT | Performed by: PSYCHIATRY & NEUROLOGY

## 2022-09-13 PROCEDURE — 250N000013 HC RX MED GY IP 250 OP 250 PS 637: Performed by: PHYSICIAN ASSISTANT

## 2022-09-13 PROCEDURE — 97530 THERAPEUTIC ACTIVITIES: CPT | Mod: GO | Performed by: OCCUPATIONAL THERAPIST

## 2022-09-13 PROCEDURE — 250N000013 HC RX MED GY IP 250 OP 250 PS 637: Performed by: PSYCHIATRY & NEUROLOGY

## 2022-09-13 RX ORDER — CLONAZEPAM 0.5 MG/1
0.5 TABLET ORAL 2 TIMES DAILY PRN
Status: DISCONTINUED | OUTPATIENT
Start: 2022-09-13 | End: 2022-09-16 | Stop reason: HOSPADM

## 2022-09-13 RX ORDER — ARIPIPRAZOLE 5 MG/1
20 TABLET ORAL DAILY
Status: DISCONTINUED | OUTPATIENT
Start: 2022-09-14 | End: 2022-09-15

## 2022-09-13 RX ORDER — ARIPIPRAZOLE 5 MG/1
5 TABLET ORAL ONCE
Status: COMPLETED | OUTPATIENT
Start: 2022-09-13 | End: 2022-09-13

## 2022-09-13 RX ADMIN — ACETAMINOPHEN 975 MG: 325 TABLET ORAL at 13:14

## 2022-09-13 RX ADMIN — Medication 1 MG: at 01:55

## 2022-09-13 RX ADMIN — INSULIN DEGLUDEC INJECTION 28 UNITS: 100 INJECTION, SOLUTION SUBCUTANEOUS at 10:22

## 2022-09-13 RX ADMIN — Medication 1 MG: at 23:24

## 2022-09-13 RX ADMIN — ARIPIPRAZOLE 5 MG: 5 TABLET ORAL at 17:56

## 2022-09-13 RX ADMIN — ARIPIPRAZOLE 15 MG: 5 TABLET ORAL at 10:19

## 2022-09-13 RX ADMIN — ATORVASTATIN CALCIUM 40 MG: 40 TABLET, FILM COATED ORAL at 22:37

## 2022-09-13 RX ADMIN — ASPIRIN 81 MG: 81 TABLET, COATED ORAL at 10:20

## 2022-09-13 RX ADMIN — ACETAMINOPHEN 975 MG: 325 TABLET ORAL at 22:37

## 2022-09-13 ASSESSMENT — ACTIVITIES OF DAILY LIVING (ADL)
ADLS_ACUITY_SCORE: 18

## 2022-09-13 NOTE — CONSULTS
[facility] Psychiatric Consult Progress Note    Interval History:   Pt seen, chart reviewed, case discussed with nursing staff and treating clinicians.     Nursing reports patient has been less talkative. No agitation episodes. On interview patient reports he is doing fine.     Aleah reports patient's geoffrey was 'rampant' yesterday afternoon. She notes that he was quite concerned about him possibly leaving tomorrow. She notes he was still a bit manic yesterday. However, later in the morning she talked to him and said he was 'so much better.' She was feeling better about him possibly going home again. She is a bit leery about concerns his medication is wearing off later in the day. She is requesting his medication be increased today.        Review of systems:   10 point Review of Systems completed by Dr. Hutchison, and is  is negative other than noted in the HPI     Medications:       acetaminophen  975 mg Oral Q8H     ARIPiprazole  15 mg Oral Daily     aspirin  81 mg Oral Daily     atorvastatin  40 mg Oral QPM     enzalutamide  160 mg Oral Daily     [START ON 9/14/2022] insulin aspart  14 Units Subcutaneous Daily with breakfast     insulin aspart  14 Units Subcutaneous Daily with lunch     insulin aspart  8 Units Subcutaneous Daily with supper     insulin aspart  1-7 Units Subcutaneous TID AC     insulin degludec  28 Units Subcutaneous QAM     lidocaine  1 patch Transdermal Q24H     lidocaine   Transdermal Q8H LISSY     sodium chloride (PF)  3 mL Intracatheter Q8H     acyclovir, clonazePAM, glucose **OR** dextrose **OR** glucagon, hydrALAZINE, HYDROmorphone, hydrOXYzine **OR** hydrOXYzine, lidocaine 4%, lidocaine (buffered or not buffered), melatonin, naloxone **OR** naloxone **OR** naloxone **OR** naloxone, OLANZapine zydis, ondansetron **OR** ondansetron, oxyCODONE, - MEDICATION INSTRUCTIONS -, prochlorperazine **OR** prochlorperazine **OR** prochlorperazine, QUEtiapine, senna-docusate **OR**  senna-docusate, sodium chloride (PF)    Mental Status Examination:     Appearance:  awake, alert  Eye Contact:  good  Speech:  quick, volumnous at times, slighlty pressured circumstantial and still tangential at times  Language:Normal  Psychomotor Behavior:  no evidence of tardive dyskinesia, dystonia, or tics  Mood:  better   Affect:  mood congruent and elevated, gregarious at times  Thought Process:  goal oriented, tangental and circumstantial no loose associations  Thought Content:  no evidence of suicidal ideation or homicidal ideation and no auditory hallucinations present  Oriented to:  time, person, and place  Attention Span and Concentration:  fair  Recent and Remote Memory:  fair  Fund of Knowledge: appropriate  Muscle Strength and Tone: normal  Gait and Station: Normal  Insight:  limited  Judgment:  limited        Labs/Vitals:     Recent Results (from the past 24 hour(s))   Glucose by meter    Collection Time: 09/12/22  1:36 PM   Result Value Ref Range    GLUCOSE BY METER POCT 309 (H) 70 - 99 mg/dL   Glucose by meter    Collection Time: 09/12/22  6:02 PM   Result Value Ref Range    GLUCOSE BY METER POCT 60 (L) 70 - 99 mg/dL   Glucose by meter    Collection Time: 09/12/22  6:34 PM   Result Value Ref Range    GLUCOSE BY METER POCT 183 (H) 70 - 99 mg/dL   Glucose by meter    Collection Time: 09/12/22 11:22 PM   Result Value Ref Range    GLUCOSE BY METER POCT 105 (H) 70 - 99 mg/dL   Glucose by meter    Collection Time: 09/13/22  2:23 AM   Result Value Ref Range    GLUCOSE BY METER POCT 85 70 - 99 mg/dL   Glucose by meter    Collection Time: 09/13/22  6:52 AM   Result Value Ref Range    GLUCOSE BY METER POCT 287 (H) 70 - 99 mg/dL   Glucose by meter    Collection Time: 09/13/22  9:26 AM   Result Value Ref Range    GLUCOSE BY METER POCT 417 (H) 70 - 99 mg/dL     B/P: 136/108, T: 98, P: 84, R: 16    Impression:   1.  Unspecified bipolar spectrum and related disorder.  2.  Possible depression episodes by  history.  3.  Prostate cancer.  4.  Diabetes mellitus, type 1.  5.  Recent COVID infection.     FORMULATION:  This is a 72-year-old gentleman with a possible history of prior depression episodes, although nothing formally diagnosed in terms of psychiatric history as well as medical history of diabetes mellitus type 1, recent COVID infection, prostate cancer under treatment with chemotherapy.  He presented with acute manic symptoms ongoing for the past week and a half in the setting of recent COVID infection as well as recent change in diabetes regimen, including a transition from metformin to insulin.  He has had blood sugars quite elevated at times above 600.  His wife also is concerned that the patient's mental status seemed to decline after he got COVID and recovered, and she believes that there are some case reports of this occurring after COVID.  There are possible side effects from medications like his enzalutamide that does have a 1%-10% risk of psychiatric adverse effects, although my suspicion of that being a causative directly in this case is low.  Nonetheless, with his family history of bipolar disorder in his sister and prior depression episodes, it is very possible the patient eventually has met the minimum threshold in terms of stressors physically and mentally in the setting of the above that eventually caused him to declare his first manic phase.  It is unusual for this to occur in your 70s of course, but not unheard of.  It is also sometimes hard to determine if the patient has had more mild subclinical hypomania phases in the past that never fully declared.    Update 9/7/22  Still presenting as manic. He is has had some labile hypertension as well today. Has refused Abilyf up to this point but with conversation of risks/benefits today he is now willing . 9/13/22 Again appearing a bit manic today.          Plan:   1.  Continue hospitalization.   2.  Reconsult psychiatry as needed.  3.   Additional  dose of Abilify 5 mg now. Increase Abilify to 20 mg tomorrow.  enzalutamide induces metabolism of Abilify so he might require continued graudual dose increaes as tolerated and to effect. Olanzapine zydis 2.5 mg TID PRN for breakthrough geoffrey, agitation, anxiety or insomnia as first line.   Klonopin 0.5 mg twice a day as needed for agitation or anxiety as second line. Patient can follow up with this writer at [facility] after discharge (appointment number [phone])  5. Once medically cleared if still demonstrating geoffrey could  consider transfer to [facility] psych though I suspect if he continues to be adherent with treatment and wife is comfortable patient is nearing his prior baseline and feeling safe with monitoring him he could potentially be discharged with close outpatient follow up with psychiatry after discharge. Reconsult psychiatry tomorrow.    Attestation:  Patient has been seen and evaluated by me,  Ramirez Hutchison MD
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
## 2022-09-13 NOTE — PLAN OF CARE
"Goal Outcome Evaluation:      Problem: Plan of Care - These are the overarching goals to be used throughout the patient stay.    Goal: Plan of Care Review/Shift Note  Description: The Plan of Care Review/Shift note should be completed every shift.  The Outcome Evaluation is a brief statement about your assessment that the patient is improving, declining, or no change.  This information will be displayed automatically on your shift note.  9/13/2022 1635 by Anny Palomo RN  Outcome: Ongoing, Progressing  Flowsheets (Taken 9/13/2022 1635)  Plan of Care Reviewed With: patient  Outcome Evaluation: Patient more calm, rested for most of the day, advised he had melatonin at 0200 in the morning. Patient requested Dr. Wilson call his wife to discuss his situation. Patient assessed this morning with no IV site, refuses to have another one place.Blood sugar continues to be an issue, ranges from 417, 538 and 528. Patient eats meals prior to assessment, difficult to achieve accurate readings at times. MD made adjustments regaring matter.  9/13/2022 1633 by Anny Palomo, ILDA  Outcome: Ongoing, Progressing  Goal: Patient-Specific Goal (Individualized)  Description: You can add care plan individualizations to a care plan. Examples of Individualization might be:  \"Parent requests to be called daily at 9am for status\", \"I have a hard time hearing out of my right ear\", or \"Do not touch me to wake me up as it startles me\".  9/13/2022 1635 by Anny Palomo, ILDA  Outcome: Ongoing, Progressing  9/13/2022 1633 by Anny Palomo, ILDA  Outcome: Ongoing, Progressing  Goal: Absence of Hospital-Acquired Illness or Injury  Outcome: Ongoing, Progressing  Intervention: Identify and Manage Fall Risk  Recent Flowsheet Documentation  Taken 9/13/2022 1000 by Anny Palomo, RN  Safety Promotion/Fall Prevention:   safety round/check completed   room organization consistent   treat underlying cause  Intervention: " Prevent Skin Injury  Recent Flowsheet Documentation  Taken 9/13/2022 1000 by Anny Palomo RN  Body Position: supine  Intervention: Prevent and Manage VTE (Venous Thromboembolism) Risk  Recent Flowsheet Documentation  Taken 9/13/2022 1000 by Anny Palomo RN  VTE Prevention/Management: (Education provided.)   SCDs (sequential compression devices) off   patient refused intervention  Activity Management: ambulated in room  Goal: Optimal Comfort and Wellbeing  9/13/2022 1635 by Anny Palomo RN  Outcome: Ongoing, Progressing  9/13/2022 1633 by Anny Palomo RN  Outcome: Ongoing, Progressing  Goal: Readiness for Transition of Care  9/13/2022 1635 by Anny Palomo RN  Outcome: Ongoing, Progressing  Flowsheets (Taken 9/13/2022 1635)  Anticipated Changes Related to Illness: none  Concerns to be Addressed: all concerns addressed in this encounter  9/13/2022 1633 by Anny Palomo RN  Outcome: Ongoing, Progressing  Intervention: Mutually Develop Transition Plan  Recent Flowsheet Documentation  Taken 9/13/2022 1635 by Anny Palomo RN  Anticipated Changes Related to Illness: none  Concerns to be Addressed: all concerns addressed in this encounter     Problem: Risk for Delirium  Goal: Optimal Coping  9/13/2022 1635 by Anny Palomo RN  Outcome: Ongoing, Progressing  9/13/2022 1633 by Anny Palomo RN  Outcome: Ongoing, Progressing  Goal: Improved Behavioral Control  9/13/2022 1635 by Anny Palomo RN  Outcome: Ongoing, Progressing  9/13/2022 1633 by Anny Palomo RN  Outcome: Ongoing, Progressing  Goal: Improved Attention and Thought Clarity  9/13/2022 1635 by Anny Palomo RN  Outcome: Ongoing, Progressing  9/13/2022 1633 by Anny Palomo RN  Outcome: Ongoing, Progressing  Goal: Improved Sleep  9/13/2022 1635 by Anny Palomo RN  Outcome: Ongoing, Progressing  9/13/2022 1633 by Anny Palomo  RN  Outcome: Ongoing, Progressing     Problem: Pain Acute  Goal: Acceptable Pain Control and Functional Ability  9/13/2022 1635 by Anny Palomo RN  Outcome: Ongoing, Progressing  9/13/2022 1633 by Anny Palomo RN  Outcome: Ongoing, Progressing  Intervention: Prevent or Manage Pain  Recent Flowsheet Documentation  Taken 9/13/2022 1000 by Anny Palomo RN  Medication Review/Management: medications reviewed     Problem: Fall Injury Risk  Goal: Absence of Fall and Fall-Related Injury  9/13/2022 1635 by Anny Palomo RN  Outcome: Ongoing, Progressing  9/13/2022 1633 by Anny Palomo RN  Outcome: Ongoing, Progressing  Intervention: Identify and Manage Contributors  Recent Flowsheet Documentation  Taken 9/13/2022 1000 by Anny Palomo RN  Medication Review/Management: medications reviewed  Intervention: Promote Injury-Free Environment  Recent Flowsheet Documentation  Taken 9/13/2022 1000 by Anny Palomo RN  Safety Promotion/Fall Prevention:   safety round/check completed   room organization consistent   treat underlying cause     Problem: Hyperglycemia  Goal: Blood Glucose Level Within Targeted Range  9/13/2022 1635 by Anny Palomo RN  Outcome: Ongoing, Progressing  9/13/2022 1633 by Anny Palomo RN  Outcome: Ongoing, Progressing

## 2022-09-13 NOTE — PROGRESS NOTES
0856 Report received from charge nurse.Anny Paulson RN    0850 Patient assessed awake, sitting in recliner, no pain or distress noted, safety measures in place and call light in reach. Inquired what happened to patient left hand IV site, advised it was removed 0200 in the morning as it was hurting him. Patient advised he declined to have another one placed as he will be leaving soon, anything he needs can be administered IM.Anny Paulson RN    0910 Patient advised that he ordered breakfast for 0930, patient advised to notify nurse when tray arrives to have blood sugar level assessed, patient verbalized understanding.Anny Paulson RN    0922 Patient call to notify that breakfast tray had arrived and he was ready for blood sugar assessment, patient found to be eating breakfast already, patient was reminded that he needed to call before, patient advised he was hungry.Anny Paulson RN    0940 Dr Martin was advised that patient ate breakfast prior to having blood sugar assessed, updated medication regimen placed.Anny Paulson RN    1036 Spoke with Mario inquired if updated insulin protocol was taken into consideration as patient started to eat breakfast prior to having glucose assessed, advised yes. Patient was reminded to notify staff prior to lunch.Anny Paulson RN    1100 Patient advised that his wife would like to speak with Dr. Hutchison regarding some more information. Advised if a message could be sent to his office to call her back.Anny Paulson RN    1126 Unit secretary sent to message to Dr. Hutchison, requesting to call back patient wife.Anny Paulson RN    1309 Patient blood glucose assessed at 536, value assessed and blood glucose to be re-assessed.Anny Paulson RN    1310 Patient blood glucose assessed at 528, patient medicated accordingly and MD to be advised accordingly.Anny Paulson RN    1329 Dr. Martin advised of patient  elevated blood glucose via charge nurse.Anny Paulson RN    9323 Patient report provided to nurse Humphreys patient awake and sitting in chair.Anny Paulson RN

## 2022-09-14 LAB
ANION GAP SERPL CALCULATED.3IONS-SCNC: 10 MMOL/L (ref 3–14)
BUN SERPL-MCNC: 20 MG/DL (ref 7–30)
CALCIUM SERPL-MCNC: 9.6 MG/DL (ref 8.5–10.1)
CHLORIDE BLD-SCNC: 94 MMOL/L (ref 94–109)
CO2 SERPL-SCNC: 24 MMOL/L (ref 20–32)
CREAT SERPL-MCNC: 0.63 MG/DL (ref 0.66–1.25)
GFR SERPL CREATININE-BSD FRML MDRD: >90 ML/MIN/1.73M2
GLUCOSE BLD-MCNC: 530 MG/DL (ref 70–99)
GLUCOSE BLDC GLUCOMTR-MCNC: 172 MG/DL (ref 70–99)
GLUCOSE BLDC GLUCOMTR-MCNC: 259 MG/DL (ref 70–99)
GLUCOSE BLDC GLUCOMTR-MCNC: 336 MG/DL (ref 70–99)
GLUCOSE BLDC GLUCOMTR-MCNC: 389 MG/DL (ref 70–99)
GLUCOSE BLDC GLUCOMTR-MCNC: 426 MG/DL (ref 70–99)
POTASSIUM BLD-SCNC: 3.9 MMOL/L (ref 3.4–5.3)
SODIUM SERPL-SCNC: 128 MMOL/L (ref 133–144)

## 2022-09-14 PROCEDURE — 250N000013 HC RX MED GY IP 250 OP 250 PS 637: Performed by: PSYCHIATRY & NEUROLOGY

## 2022-09-14 PROCEDURE — 80048 BASIC METABOLIC PNL TOTAL CA: CPT | Performed by: HOSPITALIST

## 2022-09-14 PROCEDURE — 250N000013 HC RX MED GY IP 250 OP 250 PS 637: Performed by: PHYSICIAN ASSISTANT

## 2022-09-14 PROCEDURE — 99232 SBSQ HOSP IP/OBS MODERATE 35: CPT | Performed by: HOSPITALIST

## 2022-09-14 PROCEDURE — 120N000001 HC R&B MED SURG/OB

## 2022-09-14 PROCEDURE — 36415 COLL VENOUS BLD VENIPUNCTURE: CPT | Performed by: HOSPITALIST

## 2022-09-14 RX ADMIN — ASPIRIN 81 MG: 81 TABLET, COATED ORAL at 07:35

## 2022-09-14 RX ADMIN — ATORVASTATIN CALCIUM 40 MG: 40 TABLET, FILM COATED ORAL at 21:12

## 2022-09-14 RX ADMIN — ACETAMINOPHEN 975 MG: 325 TABLET ORAL at 21:12

## 2022-09-14 RX ADMIN — ACETAMINOPHEN 975 MG: 325 TABLET ORAL at 12:03

## 2022-09-14 RX ADMIN — ARIPIPRAZOLE 20 MG: 5 TABLET ORAL at 08:36

## 2022-09-14 RX ADMIN — INSULIN DEGLUDEC INJECTION 28 UNITS: 100 INJECTION, SOLUTION SUBCUTANEOUS at 07:34

## 2022-09-14 RX ADMIN — Medication 1 MG: at 21:12

## 2022-09-14 ASSESSMENT — ACTIVITIES OF DAILY LIVING (ADL)
ADLS_ACUITY_SCORE: 18

## 2022-09-14 NOTE — PROGRESS NOTES
"0658 Report received from nurse Humphreys, patient alert and awake sitting in recliner, no pain or distress noted, safety measures in place and call light in reach.Anny Paulson RN     0820 Assessment completed, no pain or distress noted, safety measures in place and call light in reach.Anny Paulson RN    1140 Patient awake and resting, no pain or distress noted.Anny Paulson RN    1402 Patient medicated with insulin accordingly. Patient advised that he is going to have a nap after lunch.Anny Paulson RN    1506 Spoke with patient wife, advised that she would like a message relayed to Dr. Tee, requesting a hospital to hospital transfer to the [facility], MD to be page accordingly.Anny Paulson RN    1506 Page sent to Dr. Tee, advised \"Please call regarding patient, he is requesting a hospital to hospital transfer to the [facility] ([phone]), thank\".Anny Paulson RN    1885 Dr. Tee advised that [facility] is on diversion, awaiting return call from endocrinologist.Anny Paulson RN     1546 New orders for diabetes management received, advised recommendations came from [place] endocrinologist at [facility].Anny Paulson RN    0080 Patient and spouse advised they recently spoke with Dr. Tee on the phone. Advised they wanted to let him know that the patient was on the phone with Dr. Cleveland on Monday when his blood glucose was elevated and that Dr. Luna had advised he did all that he could here regarding his diabetes and that he should go to [facility]. Dr. Tee to be advised of patient concerns and requesting discharge as the patient wife can not drive at night.Anny Paulson RN    1612 Dr. Tee advised that  from [facility] did not want his discharge, patient to be advised accordingly.Anny Paulson RN    2911 Secure text sent to Dr. Tee, advised patient would like to be discharged as he has a diabetes " education class at 0845 and they booked a hotel room tat is non-refundable. Advise that I would speak with patient and spouse regarding matter.Anny Paulson RN    8799 Spoke with patient and spouse regarding care, advised that we need to follow through on recommendations of insulin regiment and also patient recently had Abilify adjust and we need to monitor him to make sure dosage is correct. Patient and spouse agree to remain here and continue treatment.Anny Paulson RN    1681 Dr. Tee responded, aware that patient has agreed to stay.Anny Paulson RN
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
## 2022-09-15 ENCOUNTER — APPOINTMENT (OUTPATIENT)
Dept: OCCUPATIONAL THERAPY | Facility: CLINIC | Age: 72
DRG: 637 | End: 2022-09-15
Payer: MEDICARE

## 2022-09-15 LAB
GLUCOSE BLDC GLUCOMTR-MCNC: 135 MG/DL (ref 70–99)
GLUCOSE BLDC GLUCOMTR-MCNC: 191 MG/DL (ref 70–99)
GLUCOSE BLDC GLUCOMTR-MCNC: 192 MG/DL (ref 70–99)
GLUCOSE BLDC GLUCOMTR-MCNC: 197 MG/DL (ref 70–99)
GLUCOSE BLDC GLUCOMTR-MCNC: 412 MG/DL (ref 70–99)

## 2022-09-15 PROCEDURE — 250N000013 HC RX MED GY IP 250 OP 250 PS 637: Performed by: PSYCHIATRY & NEUROLOGY

## 2022-09-15 PROCEDURE — 120N000001 HC R&B MED SURG/OB

## 2022-09-15 PROCEDURE — 97530 THERAPEUTIC ACTIVITIES: CPT | Mod: GO | Performed by: OCCUPATIONAL THERAPIST

## 2022-09-15 PROCEDURE — 99232 SBSQ HOSP IP/OBS MODERATE 35: CPT | Performed by: HOSPITALIST

## 2022-09-15 PROCEDURE — 99232 SBSQ HOSP IP/OBS MODERATE 35: CPT | Performed by: NURSE PRACTITIONER

## 2022-09-15 PROCEDURE — 250N000013 HC RX MED GY IP 250 OP 250 PS 637: Performed by: PHYSICIAN ASSISTANT

## 2022-09-15 RX ORDER — ARIPIPRAZOLE 5 MG/1
20 TABLET ORAL AT BEDTIME
Status: DISCONTINUED | OUTPATIENT
Start: 2022-09-16 | End: 2022-09-16 | Stop reason: HOSPADM

## 2022-09-15 RX ADMIN — ARIPIPRAZOLE 20 MG: 5 TABLET ORAL at 08:29

## 2022-09-15 RX ADMIN — ATORVASTATIN CALCIUM 40 MG: 40 TABLET, FILM COATED ORAL at 19:51

## 2022-09-15 RX ADMIN — ACETAMINOPHEN 975 MG: 325 TABLET ORAL at 13:19

## 2022-09-15 RX ADMIN — ACETAMINOPHEN 975 MG: 325 TABLET ORAL at 19:51

## 2022-09-15 RX ADMIN — ASPIRIN 81 MG: 81 TABLET, COATED ORAL at 08:28

## 2022-09-15 ASSESSMENT — ACTIVITIES OF DAILY LIVING (ADL)
ADLS_ACUITY_SCORE: 18

## 2022-09-15 NOTE — PLAN OF CARE
"Goal Outcome Evaluation:    Plan of Care Reviewed With: patient, spouse          Outcome Evaluation: Patient rested well during the shift. Blood sugar levels continue to be concern, MD made awake, endocrinologist at [facility] offered recommendations for diabetes management. Patient wanted to be discharge to attend diabetes education class on 9/15 at [place], advised his MD would prefer for him to stay. New insulin schedule started during dinner, patient glucose levels already starting to show improvement.      Problem: Plan of Care - These are the overarching goals to be used throughout the patient stay.    Goal: Plan of Care Review/Shift Note  Description: The Plan of Care Review/Shift note should be completed every shift.  The Outcome Evaluation is a brief statement about your assessment that the patient is improving, declining, or no change.  This information will be displayed automatically on your shift note.  Outcome: Ongoing, Progressing  Flowsheets (Taken 9/14/2022 1913)  Plan of Care Reviewed With:   patient   spouse  Outcome Evaluation: Patient rested well during the shift. Blood sugar levels continue to be concern, MD made awake, endocrinologist at [facility] offered recommendations for diabetes management. Patient wanted to be discharge to attend diabetes education class on 9/15 at [place], advised his MD would prefer for him to stay. New insulin schedule started during dinner, patient glucose levels already starting to show improvement.  Goal: Patient-Specific Goal (Individualized)  Description: You can add care plan individualizations to a care plan. Examples of Individualization might be:  \"Parent requests to be called daily at 9am for status\", \"I have a hard time hearing out of my right ear\", or \"Do not touch me to wake me up as it startles me\".  Outcome: Ongoing, Progressing  Goal: Absence of Hospital-Acquired Illness or Injury  Outcome: Ongoing, Progressing  Intervention: Identify and Manage Fall " Risk  Recent Flowsheet Documentation  Taken 9/14/2022 0720 by Anny Palomo RN  Safety Promotion/Fall Prevention:   room organization consistent   safety round/check completed  Intervention: Prevent Skin Injury  Recent Flowsheet Documentation  Taken 9/14/2022 0720 by Anny Palomo RN  Body Position: supine  Intervention: Prevent and Manage VTE (Venous Thromboembolism) Risk  Recent Flowsheet Documentation  Taken 9/14/2022 0720 by Anny Palomo RN  Activity Management: activity adjusted per tolerance  Goal: Optimal Comfort and Wellbeing  Outcome: Ongoing, Progressing  Goal: Readiness for Transition of Care  Outcome: Ongoing, Progressing  Flowsheets (Taken 9/14/2022 1913)  Transportation Anticipated: family or friend will provide  Concerns to be Addressed: all concerns addressed in this encounter  Intervention: Mutually Develop Transition Plan  Recent Flowsheet Documentation  Taken 9/14/2022 1913 by Anny Palomo RN  Transportation Anticipated: family or friend will provide  Concerns to be Addressed: all concerns addressed in this encounter     Problem: Risk for Delirium  Goal: Optimal Coping  Outcome: Ongoing, Progressing  Goal: Improved Behavioral Control  Outcome: Ongoing, Progressing  Goal: Improved Attention and Thought Clarity  Outcome: Ongoing, Progressing  Goal: Improved Sleep  Outcome: Ongoing, Progressing     Problem: Pain Acute  Goal: Acceptable Pain Control and Functional Ability  Outcome: Ongoing, Progressing  Intervention: Prevent or Manage Pain  Recent Flowsheet Documentation  Taken 9/14/2022 0720 by Anny Palomo RN  Medication Review/Management: medications reviewed     Problem: Fall Injury Risk  Goal: Absence of Fall and Fall-Related Injury  Outcome: Ongoing, Progressing  Intervention: Identify and Manage Contributors  Recent Flowsheet Documentation  Taken 9/14/2022 0720 by Anny Palomo RN  Medication Review/Management: medications  reviewed  Intervention: Promote Injury-Free Environment  Recent Flowsheet Documentation  Taken 9/14/2022 0720 by Anny Palomo, RN  Safety Promotion/Fall Prevention:   room organization consistent   safety round/check completed     Problem: Hyperglycemia  Goal: Blood Glucose Level Within Targeted Range  Outcome: Ongoing, Progressing
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
## 2022-09-15 NOTE — CONSULTS
Psychiatry Consultation; Follow up          Reason for Consult, requesting source:    F/U geoffrey  Requesting source: Eric Tee            Interim history:    Joshua Cannon is a 72-year-old gentleman with a possible history of prior depression episodes, although nothing formally diagnosed in terms of psychiatric history as well as medical history of diabetes mellitus type 1, recent COVID infection, prostate cancer under treatment with chemotherapy.  He presented with acute manic symptoms ongoing for the past week and a half in the setting of recent COVID infection as well as recent change in diabetes regimen, including a transition from metformin to insulin.    Patient seen for follow-up today. Reviewed previous psychiatric consults. Patient now receiving Abilify 20 mg daily. Noted to be sleeping well at night.     On interview, patient notes that he is feeling quite good today. Reports he took a melatonin last night and feels this is helpful. Endorses sleeping well overnight. Appetite is stable. He is a bit tangential and circumstantial. Tolerating Abilify without any side effects.     Spoke with his wife, Rhianna, via telephone. She reports that at 12:42 PM today, patient began slurring his words, was unable to say the hospitalist's name, was more lethargic-appearing. She notes that this same thing happened yesterday around the same time. Discussed that when patient was seen by writer, he was fully alert and oriented and had no speech difficulties. Rhianna would like his Abilify switched to nighttime to see if this resolves the issue. She was in agreement with patient staying one more night in the hospital.           Medications:     Current Facility-Administered Medications   Medication     acetaminophen (TYLENOL) tablet 975 mg     acyclovir (ZOVIRAX) tablet 400 mg     [START ON 9/16/2022] ARIPiprazole (ABILIFY) tablet 20 mg     aspirin EC tablet 81 mg     atorvastatin (LIPITOR) tablet 40 mg      clonazePAM (klonoPIN) tablet 0.5 mg     glucose gel 15-30 g    Or     dextrose 50 % injection 25-50 mL    Or     glucagon injection 1 mg     enzalutamide (XTANDI) capsule 160 mg     hydrALAZINE (APRESOLINE) injection 10 mg     HYDROmorphone (DILAUDID) injection 0.2 mg     hydrOXYzine (ATARAX) tablet 25 mg    Or     hydrOXYzine (ATARAX) tablet 50 mg     insulin aspart (NovoLOG) injection (RAPID ACTING)     insulin aspart (NovoLOG) injection (RAPID ACTING)     insulin aspart (NovoLOG) injection (RAPID ACTING)     insulin aspart (NovoLOG) injection (RAPID ACTING)     insulin aspart (NovoLOG) injection (RAPID ACTING)     insulin degludec (TRESIBA) 100 UNIT/ML injection 40 Units     Lidocaine (LIDOCARE) 4 % Patch 1 patch     lidocaine (LMX4) cream     lidocaine 1 % 0.1-1 mL     lidocaine patch in PLACE     melatonin tablet 1 mg     naloxone (NARCAN) injection 0.2 mg    Or     naloxone (NARCAN) injection 0.4 mg    Or     naloxone (NARCAN) injection 0.2 mg    Or     naloxone (NARCAN) injection 0.4 mg     OLANZapine zydis (zyPREXA) ODT half-tab 2.5 mg     ondansetron (ZOFRAN ODT) ODT tab 4 mg    Or     ondansetron (ZOFRAN) injection 4 mg     oxyCODONE IR (ROXICODONE) half-tab 2.5 mg     Patient is already receiving mechanical prophylaxis     prochlorperazine (COMPAZINE) injection 5 mg    Or     prochlorperazine (COMPAZINE) tablet 5 mg    Or     prochlorperazine (COMPAZINE) suppository 12.5 mg     QUEtiapine (SEROquel) tablet 25 mg     senna-docusate (SENOKOT-S/PERICOLACE) 8.6-50 MG per tablet 1 tablet    Or     senna-docusate (SENOKOT-S/PERICOLACE) 8.6-50 MG per tablet 2 tablet     sodium chloride (PF) 0.9% PF flush 3 mL     sodium chloride (PF) 0.9% PF flush 3 mL              MSE:     Appearance: age-appearing, casually dressed, well-groomed, sitting in bedside chair, in no acute distress  Behavior: cooperative and calm  Eye contact: fair  Orientation: alert and oriented to time, place and person  Movements: no tics,  "tremors, or dystonia  Mood: \"good\"  Affect: mood-congruent, stable, normal range  Speech: normal volume and tone, mildly pressured  Language: fluent, with appropriately placed inflections, good articulation  Memory: no impairment in immediate, recent, or remote memory  Intellectual capacity: Average for development based on word choice  Concentration: attentive to interview  Thought process and content: tangential and circumstantial, logical, coherent. No delusions or paranoia elicited. No evidence of anomalous perceptions.   Associations: no loosening  Insight: good  Judgement: intact  Safety: denies suicidal or homicidal ideation    Vital signs:  Temp: 98.4  F (36.9  C) Temp src: Oral BP: (!) 135/90 Pulse: 95   Resp: 16 SpO2: 96 % O2 Device: None (Room air) Oxygen Delivery: 2 LPM Height: 185.4 cm (6' 1\") Weight: 90.6 kg (199 lb 11.8 oz)  Estimated body mass index is 26.35 kg/m  as calculated from the following:    Height as of this encounter: 1.854 m (6' 1\").    Weight as of this encounter: 90.6 kg (199 lb 11.8 oz).              DSM-5 Diagnosis:   Unspecified bipolar spectrum and related disorder  Possible depressive episodes by history  Recent COVID infection          Assessment:   Patient seen for follow-up today. It appears his symptoms of geoffrey are improving with the addition of Abilify. He is currently receiving 20 mg daily. Continues to display some tangential and circumstantial thought, which may take some time to resolve. He is sleeping well at night.     I called and spoke to his wife, Aleah, who notes that patient had an episode at 12:42 PM where he was slurring his words and appeared lethargic. By the time I had seen the patient, he was fully alert and speech was without impairment. Peak plasma concentration of Abilify occurs between 3 to 5 hours after ingestion, so it certainly could be related. We discussed changing the dose to bedtime. Aleah would like patient to stay one more night so this can be " monitored.           Summary of Recommendations:   1) Will change Abilify dosing to bedtime due to episodes of lethargy and slurring of words about 4 hours after administration. Continue 20 mg.    2) Patient may benefit from an additional night in the hospital to ensure the slurring of his words resolves. He does not meet criteria for an involuntary hold at this point if demanding to leave.     3) Pt should follow-up with Dr. Hutchison at [facility] [place] 2[place] after discharge (appointment number [phone]).    4) Reconsult psychiatry as needed, thank you      Dominic Escalera, PMBRANDYP-BC, APRN, CNP  Consult/Liaison Psychiatry  [facility]  Provider can be paged via [facility] Paging/Directory  If I am unavailable, please contact [facility] at [phone] to reach the on-call provider.

## 2022-09-15 NOTE — PLAN OF CARE
DATE & TIME: 9/15/22 5836-4570  Cognitive Concerns/ Orientation : A & O x 4, Tangential and Hyper verbal speech.   BEHAVIOR & AGGRESSION TOOL COLOR: Green  CIWA SCORE: NA  ABNL VS/O2: VSS on RA except HTN  MOBILITY: Independent  PAIN MANAGMENT: Denies when asked. On scheduled Tylenol.   DIET: Mod CHO  BOWEL/BLADDER: Continent, multiple stools a day. Per report baseline. (Hx Crohns)  DRAIN/DEVICES: No PIV  TELEMETRY RHYTHM: NA  SKIN: Pale. Rash on abdomen (blotchy).  TESTS/PROCEDURES: None  ABNORMAL LABS: From yesterday Sodium 128/Cr 0.63. /192.   D/C DATE: Unsure, BGM stability and Mood stability.   OTHER IMPORTANT INFO: Chemo precautions in place. Showered. No complaints/stable. Psych re-consulted this afternoon.

## 2022-09-16 VITALS
WEIGHT: 193.5 LBS | RESPIRATION RATE: 18 BRPM | HEIGHT: 73 IN | OXYGEN SATURATION: 99 % | BODY MASS INDEX: 25.64 KG/M2 | DIASTOLIC BLOOD PRESSURE: 81 MMHG | HEART RATE: 66 BPM | TEMPERATURE: 97.7 F | SYSTOLIC BLOOD PRESSURE: 149 MMHG

## 2022-09-16 LAB
GLUCOSE BLDC GLUCOMTR-MCNC: 140 MG/DL (ref 70–99)
GLUCOSE BLDC GLUCOMTR-MCNC: 276 MG/DL (ref 70–99)
GLUCOSE BLDC GLUCOMTR-MCNC: 293 MG/DL (ref 70–99)

## 2022-09-16 PROCEDURE — 99238 HOSP IP/OBS DSCHRG MGMT 30/<: CPT | Performed by: HOSPITALIST

## 2022-09-16 PROCEDURE — 250N000013 HC RX MED GY IP 250 OP 250 PS 637: Performed by: PHYSICIAN ASSISTANT

## 2022-09-16 RX ORDER — ARIPIPRAZOLE 20 MG/1
20 TABLET ORAL AT BEDTIME
Qty: 30 TABLET | Refills: 0 | Status: SHIPPED | OUTPATIENT
Start: 2022-09-16

## 2022-09-16 RX ADMIN — ASPIRIN 81 MG: 81 TABLET, COATED ORAL at 09:02

## 2022-09-16 RX ADMIN — ACETAMINOPHEN 975 MG: 325 TABLET ORAL at 12:53

## 2022-09-16 ASSESSMENT — ACTIVITIES OF DAILY LIVING (ADL)
ADLS_ACUITY_SCORE: 18

## 2022-09-16 NOTE — PLAN OF CARE
DATE & TIME: 9/15/22 3-11pm  Cognitive Concerns/ Orientation: A&Ox4  BEHAVIOR & AGGRESSION TOOL COLOR: Green  CIWA SCORE: NA  ABNL VS/O2: VSS on RA except HTN  MOBILITY: Independent  PAIN MANAGMENT: Denies is on scheduled Tylenol.   DIET: Mod CHO  BOWEL/BLADDER: Continent, (Hx Crohns)  DRAIN/DEVICES: No PIV  TELEMETRY RHYTHM: NA  SKIN: Pale. Rash on abdomen (blotchy).  TESTS/PROCEDURES: None  ABNORMAL LABS:   D/C DATE: Unsure, BGM stability and Mood stability. Also moving Abilify dose to bedtime because wife reports yesterday and today about 4 hours after patient took his morning ability he had a moment of slurred speech and lethargy so psych moved timing of Abilify for tomorrow to bedtime and will wait and see if patient has some episode tomorrow or not.

## 2022-09-16 NOTE — PLAN OF CARE
Goal Outcome Evaluation:      Summary: Hyperglycemia hx prostate cancer  DATE & TIME: 9/15/22-9/16/22 5295-2508  Cognitive Concerns/ Orientation: A&Ox4  BEHAVIOR & AGGRESSION TOOL COLOR: Green  CIWA SCORE: NA  ABNL VS/O2: VSS on RA  MOBILITY: Independent  PAIN MANAGMENT: Occasional hip discomfort. Denies currently, is on scheduled Tylenol.   DIET: Mod CHO  BOWEL/BLADDER: Continent, (Hx Crohns)  DRAIN/DEVICES: No PIV  TELEMETRY RHYTHM: NA  SKIN: WDL  TESTS/PROCEDURES: None  ABNORMAL LABS:   D/C DATE: Unsure, BGM stability and Mood stability. Psych moved timing of Abilify for 9/16 to bedtime d/t wife's report of pt being lethargic and having slurred speech about 4 hours after given on 9/14 and 9/15. Psych wants to wait and see if patient has same episode tomorrow or not.   OTHER IMPORTANT INFO: Chemo precautions in place.
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
## 2022-09-18 ENCOUNTER — PATIENT OUTREACH (OUTPATIENT)
Dept: CARE COORDINATION | Facility: CLINIC | Age: 72
End: 2022-09-18

## 2022-09-18 NOTE — PROGRESS NOTES
Clinic Care Coordination Contact  [facility]/Voicemail       Clinical Data: Care Coordinator Outreach  Outreach attempted x 2.  Left message on patient's voicemail with call back information and requested return call.  Plan:Care Coordinator will try to reach patient again in 1-2 business days.    Justine Sorenson, [facility]  [phone]  [facility]

## 2022-09-19 NOTE — PLAN OF CARE
Occupational Therapy Discharge Summary    Reason for therapy discharge:    Discharged to home with home therapy.    Progress towards therapy goal(s). See goals on Care Plan in [facility] electronic health record for goal details.  Goals partially met.  Barriers to achieving goals:   discharge from facility.    Therapy recommendation(s):    Continued therapy is recommended.  Rationale/Recommendations:  Pt would benefit from home safety OT Evaluation.

## 2023-01-22 ENCOUNTER — HEALTH MAINTENANCE LETTER (OUTPATIENT)
Age: 73
End: 2023-01-22

## 2023-04-14 PROCEDURE — 88305 TISSUE EXAM BY PATHOLOGIST: CPT | Mod: 26 | Performed by: PATHOLOGY

## 2023-04-14 PROCEDURE — 88305 TISSUE EXAM BY PATHOLOGIST: CPT | Mod: TC,ORL | Performed by: OPHTHALMOLOGY

## 2023-04-17 ENCOUNTER — LAB REQUISITION (OUTPATIENT)
Dept: LAB | Facility: CLINIC | Age: 73
End: 2023-04-17
Payer: MEDICARE

## 2023-04-18 LAB
PATH REPORT.COMMENTS IMP SPEC: NORMAL
PATH REPORT.COMMENTS IMP SPEC: NORMAL
PATH REPORT.FINAL DX SPEC: NORMAL
PATH REPORT.GROSS SPEC: NORMAL
PATH REPORT.MICROSCOPIC SPEC OTHER STN: NORMAL
PATH REPORT.RELEVANT HX SPEC: NORMAL
PHOTO IMAGE: NORMAL

## 2023-04-30 ENCOUNTER — HEALTH MAINTENANCE LETTER (OUTPATIENT)
Age: 73
End: 2023-04-30

## 2023-10-01 ENCOUNTER — HEALTH MAINTENANCE LETTER (OUTPATIENT)
Age: 73
End: 2023-10-01

## 2024-02-18 ENCOUNTER — HEALTH MAINTENANCE LETTER (OUTPATIENT)
Age: 74
End: 2024-02-18

## 2024-07-07 ENCOUNTER — HEALTH MAINTENANCE LETTER (OUTPATIENT)
Age: 74
End: 2024-07-07

## 2024-11-24 ENCOUNTER — HEALTH MAINTENANCE LETTER (OUTPATIENT)
Age: 74
End: 2024-11-24

## 2025-03-09 ENCOUNTER — HEALTH MAINTENANCE LETTER (OUTPATIENT)
Age: 75
End: 2025-03-09

## 2025-06-22 ENCOUNTER — HEALTH MAINTENANCE LETTER (OUTPATIENT)
Age: 75
End: 2025-06-22